# Patient Record
Sex: FEMALE | Race: WHITE | NOT HISPANIC OR LATINO | Employment: FULL TIME | URBAN - METROPOLITAN AREA
[De-identification: names, ages, dates, MRNs, and addresses within clinical notes are randomized per-mention and may not be internally consistent; named-entity substitution may affect disease eponyms.]

---

## 2017-09-07 ENCOUNTER — CONVERSION ENCOUNTER (OUTPATIENT)
Dept: MAMMOGRAPHY | Facility: HOSPITAL | Age: 57
End: 2017-09-07

## 2018-02-22 ENCOUNTER — CONVERSION ENCOUNTER (OUTPATIENT)
Dept: FAMILY MEDICINE CLINIC | Facility: CLINIC | Age: 58
End: 2018-02-22

## 2018-02-22 ENCOUNTER — OFFICE VISIT CONVERTED (OUTPATIENT)
Dept: FAMILY MEDICINE CLINIC | Facility: CLINIC | Age: 58
End: 2018-02-22
Attending: FAMILY MEDICINE

## 2018-08-24 ENCOUNTER — OFFICE VISIT CONVERTED (OUTPATIENT)
Dept: FAMILY MEDICINE CLINIC | Facility: CLINIC | Age: 58
End: 2018-08-24
Attending: FAMILY MEDICINE

## 2019-02-18 ENCOUNTER — OFFICE VISIT CONVERTED (OUTPATIENT)
Dept: FAMILY MEDICINE CLINIC | Facility: CLINIC | Age: 59
End: 2019-02-18
Attending: FAMILY MEDICINE

## 2019-02-18 ENCOUNTER — CONVERSION ENCOUNTER (OUTPATIENT)
Dept: FAMILY MEDICINE CLINIC | Facility: CLINIC | Age: 59
End: 2019-02-18

## 2019-03-07 ENCOUNTER — HOSPITAL ENCOUNTER (OUTPATIENT)
Dept: GENERAL RADIOLOGY | Facility: HOSPITAL | Age: 59
Discharge: HOME | End: 2019-03-07
Attending: FAMILY MEDICINE

## 2019-06-27 ENCOUNTER — OFFICE VISIT CONVERTED (OUTPATIENT)
Dept: FAMILY MEDICINE CLINIC | Facility: CLINIC | Age: 59
End: 2019-06-27
Attending: FAMILY MEDICINE

## 2019-11-22 ENCOUNTER — OFFICE VISIT CONVERTED (OUTPATIENT)
Dept: FAMILY MEDICINE CLINIC | Facility: CLINIC | Age: 59
End: 2019-11-22
Attending: FAMILY MEDICINE

## 2019-12-11 ENCOUNTER — HOSPITAL ENCOUNTER (OUTPATIENT)
Dept: GENERAL RADIOLOGY | Facility: HOSPITAL | Age: 59
Discharge: HOME | End: 2019-12-11
Attending: FAMILY MEDICINE

## 2019-12-20 ENCOUNTER — HOSPITAL ENCOUNTER (OUTPATIENT)
Dept: GENERAL RADIOLOGY | Facility: HOSPITAL | Age: 59
Discharge: HOME | End: 2019-12-20
Attending: FAMILY MEDICINE

## 2020-01-02 ENCOUNTER — HOSPITAL ENCOUNTER (OUTPATIENT)
Dept: GENERAL RADIOLOGY | Facility: HOSPITAL | Age: 60
Discharge: HOME | End: 2020-01-02
Attending: FAMILY MEDICINE

## 2020-01-03 ENCOUNTER — OFFICE VISIT CONVERTED (OUTPATIENT)
Dept: ORTHOPEDIC SURGERY | Facility: CLINIC | Age: 60
End: 2020-01-03
Attending: ORTHOPAEDIC SURGERY

## 2020-01-03 ENCOUNTER — CONVERSION ENCOUNTER (OUTPATIENT)
Dept: ORTHOPEDIC SURGERY | Facility: CLINIC | Age: 60
End: 2020-01-03

## 2020-01-23 ENCOUNTER — OFFICE VISIT CONVERTED (OUTPATIENT)
Dept: FAMILY MEDICINE CLINIC | Facility: CLINIC | Age: 60
End: 2020-01-23
Attending: FAMILY MEDICINE

## 2020-02-04 ENCOUNTER — HOSPITAL ENCOUNTER (OUTPATIENT)
Dept: PREADMISSION TESTING | Facility: HOSPITAL | Age: 60
Discharge: HOME | End: 2020-02-04
Attending: ORTHOPAEDIC SURGERY

## 2020-02-04 LAB
ALBUMIN SERPL-MCNC: 4.2 G/DL (ref 3.5–5)
ALBUMIN/GLOB SERPL: 1.7 {RATIO} (ref 1.4–2.6)
ALP SERPL-CCNC: 70 U/L (ref 53–141)
ALT SERPL-CCNC: 11 U/L (ref 10–40)
ANION GAP SERPL CALC-SCNC: 15 MMOL/L (ref 8–19)
APTT BLD: 23.9 S (ref 22.2–34.2)
AST SERPL-CCNC: 16 U/L (ref 15–50)
BASOPHILS # BLD AUTO: 0.05 10*3/UL (ref 0–0.2)
BASOPHILS NFR BLD AUTO: 0.7 % (ref 0–3)
BILIRUB SERPL-MCNC: 0.32 MG/DL (ref 0.2–1.3)
BUN SERPL-MCNC: 15 MG/DL (ref 5–25)
BUN/CREAT SERPL: 23 {RATIO} (ref 6–20)
CALCIUM SERPL-MCNC: 9.5 MG/DL (ref 8.7–10.4)
CHLORIDE SERPL-SCNC: 102 MMOL/L (ref 99–111)
CONV ABS IMM GRAN: 0.05 10*3/UL (ref 0–0.2)
CONV CO2: 25 MMOL/L (ref 22–32)
CONV IMMATURE GRAN: 0.7 % (ref 0–1.8)
CONV TOTAL PROTEIN: 6.7 G/DL (ref 6.3–8.2)
CREAT UR-MCNC: 0.66 MG/DL (ref 0.5–0.9)
DEPRECATED RDW RBC AUTO: 41.6 FL (ref 36.4–46.3)
EOSINOPHIL # BLD AUTO: 0.18 10*3/UL (ref 0–0.7)
EOSINOPHIL # BLD AUTO: 2.4 % (ref 0–7)
ERYTHROCYTE [DISTWIDTH] IN BLOOD BY AUTOMATED COUNT: 12.8 % (ref 11.7–14.4)
EST. AVERAGE GLUCOSE BLD GHB EST-MCNC: 114 MG/DL
GFR SERPLBLD BASED ON 1.73 SQ M-ARVRAT: >60 ML/MIN/{1.73_M2}
GLOBULIN UR ELPH-MCNC: 2.5 G/DL (ref 2–3.5)
GLUCOSE SERPL-MCNC: 93 MG/DL (ref 65–99)
HBA1C MFR BLD: 5.6 % (ref 3.5–5.7)
HCT VFR BLD AUTO: 39.6 % (ref 37–47)
HGB BLD-MCNC: 13.1 G/DL (ref 12–16)
INR PPP: 0.94 (ref 2–3)
LYMPHOCYTES # BLD AUTO: 2.16 10*3/UL (ref 1–5)
LYMPHOCYTES NFR BLD AUTO: 29.4 % (ref 20–45)
MCH RBC QN AUTO: 29.6 PG (ref 27–31)
MCHC RBC AUTO-ENTMCNC: 33.1 G/DL (ref 33–37)
MCV RBC AUTO: 89.4 FL (ref 81–99)
MONOCYTES # BLD AUTO: 0.67 10*3/UL (ref 0.2–1.2)
MONOCYTES NFR BLD AUTO: 9.1 % (ref 3–10)
NEUTROPHILS # BLD AUTO: 4.24 10*3/UL (ref 2–8)
NEUTROPHILS NFR BLD AUTO: 57.7 % (ref 30–85)
NRBC CBCN: 0 % (ref 0–0.7)
OSMOLALITY SERPL CALC.SUM OF ELEC: 287 MOSM/KG (ref 273–304)
PLATELET # BLD AUTO: 243 10*3/UL (ref 130–400)
PMV BLD AUTO: 9.3 FL (ref 9.4–12.3)
POTASSIUM SERPL-SCNC: 4.3 MMOL/L (ref 3.5–5.3)
PROTHROMBIN TIME: 10.3 S (ref 9.4–12)
RBC # BLD AUTO: 4.43 10*6/UL (ref 4.2–5.4)
SODIUM SERPL-SCNC: 138 MMOL/L (ref 135–147)
WBC # BLD AUTO: 7.35 10*3/UL (ref 4.8–10.8)

## 2020-02-12 ENCOUNTER — HOSPITAL ENCOUNTER (OUTPATIENT)
Dept: MAMMOGRAPHY | Facility: HOSPITAL | Age: 60
Discharge: HOME | End: 2020-02-12
Attending: FAMILY MEDICINE

## 2020-02-13 ENCOUNTER — OFFICE VISIT CONVERTED (OUTPATIENT)
Dept: GASTROENTEROLOGY | Facility: CLINIC | Age: 60
End: 2020-02-13
Attending: NURSE PRACTITIONER

## 2020-02-25 ENCOUNTER — HOSPITAL ENCOUNTER (OUTPATIENT)
Dept: PERIOP | Facility: HOSPITAL | Age: 60
Setting detail: HOSPITAL OUTPATIENT SURGERY
Discharge: HOME | End: 2020-02-26
Attending: FAMILY MEDICINE

## 2020-02-26 LAB
ANION GAP SERPL CALC-SCNC: 16 MMOL/L (ref 8–19)
BUN SERPL-MCNC: 12 MG/DL (ref 5–25)
BUN/CREAT SERPL: 15 {RATIO} (ref 6–20)
CALCIUM SERPL-MCNC: 9.1 MG/DL (ref 8.7–10.4)
CHLORIDE SERPL-SCNC: 100 MMOL/L (ref 99–111)
CONV CO2: 25 MMOL/L (ref 22–32)
CREAT UR-MCNC: 0.79 MG/DL (ref 0.5–0.9)
GFR SERPLBLD BASED ON 1.73 SQ M-ARVRAT: >60 ML/MIN/{1.73_M2}
GLUCOSE SERPL-MCNC: 123 MG/DL (ref 65–99)
HCT VFR BLD AUTO: 34.3 % (ref 37–47)
HGB BLD-MCNC: 11.6 G/DL (ref 12–16)
OSMOLALITY SERPL CALC.SUM OF ELEC: 285 MOSM/KG (ref 273–304)
POTASSIUM SERPL-SCNC: 4.2 MMOL/L (ref 3.5–5.3)
SODIUM SERPL-SCNC: 137 MMOL/L (ref 135–147)

## 2020-03-09 ENCOUNTER — OFFICE VISIT CONVERTED (OUTPATIENT)
Dept: ORTHOPEDIC SURGERY | Facility: CLINIC | Age: 60
End: 2020-03-09
Attending: PHYSICIAN ASSISTANT

## 2020-04-13 ENCOUNTER — OFFICE VISIT CONVERTED (OUTPATIENT)
Dept: ORTHOPEDIC SURGERY | Facility: CLINIC | Age: 60
End: 2020-04-13
Attending: ORTHOPAEDIC SURGERY

## 2020-04-23 ENCOUNTER — HOSPITAL ENCOUNTER (OUTPATIENT)
Dept: LAB | Facility: HOSPITAL | Age: 60
Discharge: HOME | End: 2020-04-23
Attending: FAMILY MEDICINE

## 2020-04-23 LAB
25(OH)D3 SERPL-MCNC: 45.1 NG/ML (ref 30–100)
ALBUMIN SERPL-MCNC: 4.5 G/DL (ref 3.5–5)
ALBUMIN/GLOB SERPL: 1.9 {RATIO} (ref 1.4–2.6)
ALP SERPL-CCNC: 73 U/L (ref 53–141)
ALT SERPL-CCNC: 14 U/L (ref 10–40)
ANION GAP SERPL CALC-SCNC: 16 MMOL/L (ref 8–19)
AST SERPL-CCNC: 16 U/L (ref 15–50)
BASOPHILS # BLD AUTO: 0.05 10*3/UL (ref 0–0.2)
BASOPHILS NFR BLD AUTO: 0.8 % (ref 0–3)
BILIRUB SERPL-MCNC: 0.37 MG/DL (ref 0.2–1.3)
BUN SERPL-MCNC: 15 MG/DL (ref 5–25)
BUN/CREAT SERPL: 18 {RATIO} (ref 6–20)
CALCIUM SERPL-MCNC: 9.7 MG/DL (ref 8.7–10.4)
CHLORIDE SERPL-SCNC: 103 MMOL/L (ref 99–111)
CHOLEST SERPL-MCNC: 204 MG/DL (ref 107–200)
CHOLEST/HDLC SERPL: 4.3 {RATIO} (ref 3–6)
CONV ABS IMM GRAN: 0.01 10*3/UL (ref 0–0.2)
CONV CO2: 26 MMOL/L (ref 22–32)
CONV IMMATURE GRAN: 0.2 % (ref 0–1.8)
CONV TOTAL PROTEIN: 6.9 G/DL (ref 6.3–8.2)
CREAT UR-MCNC: 0.82 MG/DL (ref 0.5–0.9)
DEPRECATED RDW RBC AUTO: 40.9 FL (ref 36.4–46.3)
EOSINOPHIL # BLD AUTO: 0.27 10*3/UL (ref 0–0.7)
EOSINOPHIL # BLD AUTO: 4.5 % (ref 0–7)
ERYTHROCYTE [DISTWIDTH] IN BLOOD BY AUTOMATED COUNT: 12.5 % (ref 11.7–14.4)
FOLATE SERPL-MCNC: 6.5 NG/ML (ref 4.8–20)
GFR SERPLBLD BASED ON 1.73 SQ M-ARVRAT: >60 ML/MIN/{1.73_M2}
GLOBULIN UR ELPH-MCNC: 2.4 G/DL (ref 2–3.5)
GLUCOSE SERPL-MCNC: 94 MG/DL (ref 65–99)
HCT VFR BLD AUTO: 41.8 % (ref 37–47)
HDLC SERPL-MCNC: 48 MG/DL (ref 40–60)
HGB BLD-MCNC: 13.4 G/DL (ref 12–16)
IRON SATN MFR SERPL: 28 % (ref 20–55)
IRON SERPL-MCNC: 90 UG/DL (ref 60–170)
LDLC SERPL CALC-MCNC: 134 MG/DL (ref 70–100)
LYMPHOCYTES # BLD AUTO: 1.97 10*3/UL (ref 1–5)
LYMPHOCYTES NFR BLD AUTO: 33.1 % (ref 20–45)
MCH RBC QN AUTO: 28.8 PG (ref 27–31)
MCHC RBC AUTO-ENTMCNC: 32.1 G/DL (ref 33–37)
MCV RBC AUTO: 89.7 FL (ref 81–99)
MONOCYTES # BLD AUTO: 0.58 10*3/UL (ref 0.2–1.2)
MONOCYTES NFR BLD AUTO: 9.7 % (ref 3–10)
NEUTROPHILS # BLD AUTO: 3.07 10*3/UL (ref 2–8)
NEUTROPHILS NFR BLD AUTO: 51.7 % (ref 30–85)
NRBC CBCN: 0 % (ref 0–0.7)
OSMOLALITY SERPL CALC.SUM OF ELEC: 293 MOSM/KG (ref 273–304)
PLATELET # BLD AUTO: 330 10*3/UL (ref 130–400)
PMV BLD AUTO: 9.6 FL (ref 9.4–12.3)
POTASSIUM SERPL-SCNC: 4.3 MMOL/L (ref 3.5–5.3)
RBC # BLD AUTO: 4.66 10*6/UL (ref 4.2–5.4)
SODIUM SERPL-SCNC: 141 MMOL/L (ref 135–147)
T4 FREE SERPL-MCNC: 1.5 NG/DL (ref 0.9–1.8)
TIBC SERPL-MCNC: 326 UG/DL (ref 245–450)
TRANSFERRIN SERPL-MCNC: 228 MG/DL (ref 250–380)
TRIGL SERPL-MCNC: 110 MG/DL (ref 40–150)
TSH SERPL-ACNC: 0.81 M[IU]/L (ref 0.27–4.2)
VIT B12 SERPL-MCNC: 529 PG/ML (ref 211–911)
VLDLC SERPL-MCNC: 22 MG/DL (ref 5–37)
WBC # BLD AUTO: 5.95 10*3/UL (ref 4.8–10.8)

## 2020-04-24 LAB — T3FREE SERPL-MCNC: 2.7 PG/ML (ref 2–4.4)

## 2020-05-22 ENCOUNTER — TELEMEDICINE CONVERTED (OUTPATIENT)
Dept: FAMILY MEDICINE CLINIC | Facility: CLINIC | Age: 60
End: 2020-05-22
Attending: FAMILY MEDICINE

## 2020-05-27 ENCOUNTER — OFFICE VISIT CONVERTED (OUTPATIENT)
Dept: ORTHOPEDIC SURGERY | Facility: CLINIC | Age: 60
End: 2020-05-27
Attending: ORTHOPAEDIC SURGERY

## 2020-08-20 ENCOUNTER — HOSPITAL ENCOUNTER (OUTPATIENT)
Dept: GENERAL RADIOLOGY | Facility: HOSPITAL | Age: 60
Discharge: HOME | End: 2020-08-20
Attending: FAMILY MEDICINE

## 2020-11-13 ENCOUNTER — HOSPITAL ENCOUNTER (OUTPATIENT)
Dept: GENERAL RADIOLOGY | Facility: HOSPITAL | Age: 60
Discharge: HOME | End: 2020-11-13
Attending: PODIATRIST

## 2020-11-19 ENCOUNTER — HOSPITAL ENCOUNTER (OUTPATIENT)
Dept: LAB | Facility: HOSPITAL | Age: 60
Discharge: HOME | End: 2020-11-19
Attending: FAMILY MEDICINE

## 2020-11-19 LAB
25(OH)D3 SERPL-MCNC: 58.8 NG/ML (ref 30–100)
ALBUMIN SERPL-MCNC: 4.3 G/DL (ref 3.5–5)
ALBUMIN/GLOB SERPL: 1.8 {RATIO} (ref 1.4–2.6)
ALP SERPL-CCNC: 73 U/L (ref 53–141)
ALT SERPL-CCNC: 14 U/L (ref 10–40)
ANION GAP SERPL CALC-SCNC: 16 MMOL/L (ref 8–19)
AST SERPL-CCNC: 17 U/L (ref 15–50)
BASOPHILS # BLD AUTO: 0.05 10*3/UL (ref 0–0.2)
BASOPHILS NFR BLD AUTO: 0.8 % (ref 0–3)
BILIRUB SERPL-MCNC: 0.32 MG/DL (ref 0.2–1.3)
BUN SERPL-MCNC: 16 MG/DL (ref 5–25)
BUN/CREAT SERPL: 15 {RATIO} (ref 6–20)
CALCIUM SERPL-MCNC: 10.7 MG/DL (ref 8.7–10.4)
CHLORIDE SERPL-SCNC: 105 MMOL/L (ref 99–111)
CHOLEST SERPL-MCNC: 182 MG/DL (ref 107–200)
CHOLEST/HDLC SERPL: 3.8 {RATIO} (ref 3–6)
CONV ABS IMM GRAN: 0.02 10*3/UL (ref 0–0.2)
CONV CO2: 26 MMOL/L (ref 22–32)
CONV IMMATURE GRAN: 0.3 % (ref 0–1.8)
CONV TOTAL PROTEIN: 6.7 G/DL (ref 6.3–8.2)
CREAT UR-MCNC: 1.09 MG/DL (ref 0.5–0.9)
DEPRECATED RDW RBC AUTO: 38.8 FL (ref 36.4–46.3)
EOSINOPHIL # BLD AUTO: 0.19 10*3/UL (ref 0–0.7)
EOSINOPHIL # BLD AUTO: 3 % (ref 0–7)
ERYTHROCYTE [DISTWIDTH] IN BLOOD BY AUTOMATED COUNT: 12.1 % (ref 11.7–14.4)
FOLATE SERPL-MCNC: 6.7 NG/ML (ref 4.8–20)
GFR SERPLBLD BASED ON 1.73 SQ M-ARVRAT: 56 ML/MIN/{1.73_M2}
GLOBULIN UR ELPH-MCNC: 2.4 G/DL (ref 2–3.5)
GLUCOSE SERPL-MCNC: 98 MG/DL (ref 65–99)
HCT VFR BLD AUTO: 41.8 % (ref 37–47)
HDLC SERPL-MCNC: 48 MG/DL (ref 40–60)
HGB BLD-MCNC: 13.9 G/DL (ref 12–16)
IRON SATN MFR SERPL: 26 % (ref 20–55)
IRON SERPL-MCNC: 87 UG/DL (ref 60–170)
LDLC SERPL CALC-MCNC: 111 MG/DL (ref 70–100)
LYMPHOCYTES # BLD AUTO: 2.4 10*3/UL (ref 1–5)
LYMPHOCYTES NFR BLD AUTO: 37.4 % (ref 20–45)
MCH RBC QN AUTO: 29.2 PG (ref 27–31)
MCHC RBC AUTO-ENTMCNC: 33.3 G/DL (ref 33–37)
MCV RBC AUTO: 87.8 FL (ref 81–99)
MONOCYTES # BLD AUTO: 0.52 10*3/UL (ref 0.2–1.2)
MONOCYTES NFR BLD AUTO: 8.1 % (ref 3–10)
NEUTROPHILS # BLD AUTO: 3.24 10*3/UL (ref 2–8)
NEUTROPHILS NFR BLD AUTO: 50.4 % (ref 30–85)
NRBC CBCN: 0 % (ref 0–0.7)
OSMOLALITY SERPL CALC.SUM OF ELEC: 297 MOSM/KG (ref 273–304)
PLATELET # BLD AUTO: 292 10*3/UL (ref 130–400)
PMV BLD AUTO: 9.8 FL (ref 9.4–12.3)
POTASSIUM SERPL-SCNC: 4.3 MMOL/L (ref 3.5–5.3)
RBC # BLD AUTO: 4.76 10*6/UL (ref 4.2–5.4)
SODIUM SERPL-SCNC: 143 MMOL/L (ref 135–147)
T4 FREE SERPL-MCNC: 1.3 NG/DL (ref 0.9–1.8)
TIBC SERPL-MCNC: 330 UG/DL (ref 245–450)
TRANSFERRIN SERPL-MCNC: 231 MG/DL (ref 250–380)
TRIGL SERPL-MCNC: 116 MG/DL (ref 40–150)
TSH SERPL-ACNC: 2.94 M[IU]/L (ref 0.27–4.2)
VIT B12 SERPL-MCNC: 350 PG/ML (ref 211–911)
VLDLC SERPL-MCNC: 23 MG/DL (ref 5–37)
WBC # BLD AUTO: 6.42 10*3/UL (ref 4.8–10.8)

## 2020-11-23 ENCOUNTER — CONVERSION ENCOUNTER (OUTPATIENT)
Dept: PODIATRY | Facility: CLINIC | Age: 60
End: 2020-11-23

## 2020-11-23 ENCOUNTER — OFFICE VISIT CONVERTED (OUTPATIENT)
Dept: PODIATRY | Facility: CLINIC | Age: 60
End: 2020-11-23
Attending: PODIATRIST

## 2020-11-24 ENCOUNTER — OFFICE VISIT CONVERTED (OUTPATIENT)
Dept: FAMILY MEDICINE CLINIC | Facility: CLINIC | Age: 60
End: 2020-11-24
Attending: FAMILY MEDICINE

## 2021-01-04 ENCOUNTER — OFFICE VISIT CONVERTED (OUTPATIENT)
Dept: PODIATRY | Facility: CLINIC | Age: 61
End: 2021-01-04
Attending: PODIATRIST

## 2021-01-04 ENCOUNTER — HOSPITAL ENCOUNTER (OUTPATIENT)
Dept: PREADMISSION TESTING | Facility: HOSPITAL | Age: 61
Discharge: HOME | End: 2021-01-04
Attending: PODIATRIST

## 2021-01-05 LAB — SARS-COV-2 RNA SPEC QL NAA+PROBE: NOT DETECTED

## 2021-01-08 ENCOUNTER — HOSPITAL ENCOUNTER (OUTPATIENT)
Dept: PERIOP | Facility: HOSPITAL | Age: 61
Setting detail: HOSPITAL OUTPATIENT SURGERY
Discharge: HOME | End: 2021-01-08
Attending: PODIATRIST

## 2021-01-13 ENCOUNTER — OFFICE VISIT CONVERTED (OUTPATIENT)
Dept: PODIATRY | Facility: CLINIC | Age: 61
End: 2021-01-13
Attending: PODIATRIST

## 2021-01-13 ENCOUNTER — CONVERSION ENCOUNTER (OUTPATIENT)
Dept: PODIATRY | Facility: CLINIC | Age: 61
End: 2021-01-13

## 2021-01-27 ENCOUNTER — OFFICE VISIT CONVERTED (OUTPATIENT)
Dept: PODIATRY | Facility: CLINIC | Age: 61
End: 2021-01-27
Attending: PODIATRIST

## 2021-02-17 ENCOUNTER — OFFICE VISIT CONVERTED (OUTPATIENT)
Dept: PODIATRY | Facility: CLINIC | Age: 61
End: 2021-02-17
Attending: PODIATRIST

## 2021-02-26 ENCOUNTER — HOSPITAL ENCOUNTER (OUTPATIENT)
Dept: GENERAL RADIOLOGY | Facility: HOSPITAL | Age: 61
Discharge: HOME | End: 2021-02-26
Attending: FAMILY MEDICINE

## 2021-03-01 ENCOUNTER — OFFICE VISIT CONVERTED (OUTPATIENT)
Dept: ORTHOPEDIC SURGERY | Facility: CLINIC | Age: 61
End: 2021-03-01
Attending: ORTHOPAEDIC SURGERY

## 2021-03-08 ENCOUNTER — HOSPITAL ENCOUNTER (OUTPATIENT)
Dept: GENERAL RADIOLOGY | Facility: HOSPITAL | Age: 61
Discharge: HOME | End: 2021-03-08
Attending: PODIATRIST

## 2021-03-10 ENCOUNTER — OFFICE VISIT CONVERTED (OUTPATIENT)
Dept: PODIATRY | Facility: CLINIC | Age: 61
End: 2021-03-10
Attending: PODIATRIST

## 2021-04-13 ENCOUNTER — HOSPITAL ENCOUNTER (OUTPATIENT)
Dept: GENERAL RADIOLOGY | Facility: HOSPITAL | Age: 61
Discharge: HOME | End: 2021-04-13
Attending: INTERNAL MEDICINE

## 2021-05-10 NOTE — H&P
History and Physical      Patient Name: Elaine Sprigler   Patient ID: [id number]   Sex: Female   YOB: 1960    Primary Care Provider: Darnell Umana MD   Referring Provider: Darnell Umana MD    Visit Date: November 23, 2020    Provider: Dwight Campbell DPM   Location: [facility]   Location Address: [address] [place] [address]  [id number]   Location Phone: [phone]          Chief Complaint  · Left Foot Pain      History Of Present Illness  Elaine L. Sprigler is a 59 year old /White female who presents to the [facility] today new patient referred from Darnell Umana MD.      New, Established, New Problem:  new  Location:  Left 1st and 2nd toe  Duration:  2000  Onset:  post-op from injury and bunionectomy in 2000 in [place]  Nature:  sore  Stable, worsening, improving:  worsening    Aggravating factors:   Patient relates pain is aggravated by shoe gear and ambulation.   Previous Treatment:  ORIF from fracture in 2000.    Patient denies any fevers, chills, nausea, vomiting, shortness of breathe, nor any other constitutional signs nor symptoms.    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         Past Medical History  Abdominal pain; Anemia; Bladder Disorder; Broken Bones; Bunion; Corns and callus; Depression; Diverticulitis; Elevated glucose; Essential hypertension; Fatigue; Gastric Ulcer; GERD (gastroesophageal reflux disease); Headache; Heart Attack; Heart Disease; Heart Murmur; Hernia; High blood pressure; High cholesterol; Hyperlipemia; Hypertension; Hypothyroidism; Limb Swelling; Migraine Headaches; Mitral valve prolapse; Primary osteoarthritis of hip, right; Primary osteoarthritis of right hip; Reflux Disease; Right Hip Trochanteric Bursitis; Seasonal allergies; Stress Incontinence, Female; Syncope and collapse; Thyroid disease; Thyroid disorder; Thyroid Problems; Weight Gain, Abnormal         Past Surgical History  Arthroscopic knee surgery, left; Artificial  Joints/Limbs; Colonoscopy; Excision of Mortons neuroma; Eye Implant; Foot surgery; Gastric Sleeve; Hysterectomy; Joint Surgery; Knee Replacement; Tonsillectomy         Medication List  clotrimazole-betamethasone 1-0.05 % topical cream; nystatin 100,000 unit/gram topical powder; pravastatin 40 mg oral tablet; Singulair 10 mg oral tablet; spironolactone 25 mg oral tablet; Tirosint 125 mcg oral capsule; Vitamin D3 125 mcg (5,000 unit) oral tablet; Zyrtec 10 mg oral tablet         Allergy List  morphine       Allergies Reconciled  Family Medical History  Stroke; Heart Disease; Diabetes, unspecified type; Hypothyroidism; Heart Attack (MI); Family history of certain chronic disabling diseases; arthritis; No family history of colorectal cancer; Family history of stroke; Family history of heart disease         Reproductive History   5 Para 0 0 0 3       Social History  Alcohol (Current - status unknown); Alcohol Use (Never); Claustophobic (Unknown); Customer Service; 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); lives with children; lives with spouse; ; Recreational Drug Use (Never); Sedentary; Tobacco (Former); Tobacco use (Current every day); Unemployed; ; Working         Immunizations  Name Date Admin   Hepatitis A 2019   Hepatitis A 2018   Influenza 2019   Influenza 2019   Influenza 10/17/2016   Akobynnrw39 2019         Review of Systems  · Constitutional  o Denies  o : fatigue, night sweats  · Eyes  o Denies  o : double vision, blurred vision  · HENT  o Denies  o : vertigo, recent head injury  · Cardiovascular  o Denies  o : chest pain, irregular heart beats  · Respiratory  o Denies  o : shortness of breath, productive cough  · Gastrointestinal  o Denies  o : nausea, vomiting  · Genitourinary  o Denies  o : dysuria, urinary retention  · Integument  o Denies  o : hair growth change, new skin lesions  · Neurologic  o Denies  o : altered mental status, seizures  · Musculoskeletal  o *  "See HPI  · Endocrine  o Denies  o : cold intolerance, heat intolerance  · Heme-Lymph  o Denies  o : petechiae, lymph node enlargement or tenderness  · Allergic-Immunologic  o Denies  o : frequent illnesses      Vitals  Date Time BP Position Site L\R Cuff Size HR RR TEMP (F) WT  HT  BMI kg/m2 BSA m2 O2 Sat FR L/min FiO2 HC       11/23/2020 08:03 /60 Sitting    90 - R  97.6 220lbs 8oz 5'  7\" 34.53 2.17 98 %            Physical Examination  · Constitutional  o Appearance  o : well developed, well-nourished, no obvious deformities present  · Cardiovascular  o Peripheral Vascular System  o :   § Pedal Pulses  § : pulses 2 + and symmetrical  § Extremities  § : no edema in lower extremities  · Musculoskeletal  o General  o :   § General Musculoskeletal  § : Lower extremity muscle and strength and range of motion is equal and symmetrical bilaterally. The knees are noted to be normal in alignment. Left Medial deviation of the first metatarsal with associated lateral deviation of the hallux at the metatarsal phalangeal joint and reducible Left 2nd contracted hammertoe. No signs of edema, erythema, lymphangitis, nor signs of infection.   · Skin and Subcutaneous Tissue  o General Inspection  o : Skin is noted to have normal texture and turgor, with no excrescences noted.   o Digits and Nails  o : The toenails are noted to be without disease.  · Neurologic  o Sensation  o : Epicritic sensations intact bilaterally.     Dr. Campbell reviewed radiographs and results from [facility] and discussed them with the patient.  These are significant for Left Medial deviation of the first metatarsal with associated lateral deviation of the hallux at the metatarsal phalangeal joint contracted 2nd toe.           Assessment  · Foot pain, left     729.5/M79.672  · Hammertoe of left foot     735.4/M20.42  · Hallux abducto valgus, left     735.0/M20.12      Plan  · Orders  o SAQIB Report (KASPR) - - 11/23/2020  o [facility] [place] Covid-19 Screening (10888) - - 11/23/2020  o Sang bunionectomy (85494) - - 11/23/2020  o Bunionectomy, Silvia (53108) - - 11/23/2020  o Arthrodesis of left second toe (81161) - - 11/23/2020  o Flexor tenolysis of foot (18616) - - 11/23/2020  o Capsulotomy of metatarsophalangeal joint (23538) - - 11/23/2020  · Medications  o Medications have been Reconciled  o Transition of Care or Provider Policy  · Instructions  o Discuss Findings: I have discussed the findings of this evaluation with the patient. The discussion included a complete verbal explanation of any changes in the examination results, diagnosis, and the current treatment plan. A schedule for future care needs was explained. If any questions should arise after returning home, I have encouraged the patient to feel free to contact Dr. Campbell. The patient states understanding and agreement with this plan.  o Procedure: Left distal bunionectomy and Left 2nd toe arthroplasty of DIPJ/PIPJ/MPJ. Upon discussion of non-surgical conservative option, surgical correction, post-operative requirements along with risk and benefits of the surgery along with expected outcomes, the patient states they would like to proceed with the scheduling surgery.  o Electronically Identified Patient Education Materials Provided Electronically  · Disposition  o Call or Return if symptoms worsen or persist.            Electronically Signed by: Dwight Campbell DPM -Author on November 23, 2020 08:31:54 AM

## 2021-05-12 NOTE — PROGRESS NOTES
Progress Note      Patient Name: Elaine Sprigler   Patient ID: [id number]   Sex: Female   YOB: 1960    Primary Care Provider: Darnell Umana MD   Referring Provider: Darnell Umana MD    Visit Date: April 13, 2020    Provider: Osito St MD   Location: [facility]   Location Address: [address] [place] [address]  [id number]   Location Phone: [phone]          Chief Complaint  · Left knee pain       History Of Present Illness  Elaine L. Sprigler is a 59 year old /White female who presents today to [facility]. Patient is post-op left total knee arthroplasty performed on 2/25/20. Patient is attending physical therapy at [facility]. Patient states mild pain. Patient states continue to improve on range of motion and strength. Patient is using a cane for ambulation assistance.       Past Medical History  Abdominal pain; Anemia; Bladder Disorder; Broken Bones; Depression; Diverticulitis; Elevated glucose; Essential hypertension; Fatigue; Gastric Ulcer; GERD (gastroesophageal reflux disease); Headache; Heart Attack; Heart Disease; Heart Murmur; Hernia; High blood pressure; High cholesterol; Hyperlipemia; Hypertension; Hypothyroidism; Limb Swelling; Migraine Headaches; Mitral valve prolapse; Primary osteoarthritis of hip, right; Primary osteoarthritis of right hip; Reflux Disease; Right Hip Trochanteric Bursitis; Seasonal allergies; Stress Incontinence, Female; Syncope and collapse; Thyroid disease; Thyroid disorder; Thyroid Problems; Weight Gain, Abnormal         Past Surgical History  Arthroscopic knee surgery, left; Artificial Joints/Limbs; Colonoscopy; Eye Implant; Foot surgery; Gastric Sleeve; Hysterectomy; Joint Surgery; Knee Replacement; Tonsillectomy         Medication List  clotrimazole-betamethasone 1-0.05 % topical cream; nystatin 100,000 unit/gram topical powder; Percocet 7.5-325 mg oral tablet; pravastatin 40 mg oral tablet; Singulair 10 mg oral tablet; spironolactone  "25 mg oral tablet; Tirosint 125 mcg oral capsule; Vitamin D3 5,000 unit oral tablet; Zyrtec 10 mg oral tablet         Allergy List  morphine         Family Medical History  Stroke; Heart Disease; Diabetes, unspecified type; Hypothyroidism; Heart Attack (MI); Family history of certain chronic disabling diseases; arthritis; No family history of colorectal cancer; Family history of stroke; Family history of heart disease         Reproductive History   5 Para 0 0 0 3       Social History  Alcohol (Current - status unknown); Alcohol Use (Never); Claustophobic (Unknown); Customer Service; 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); lives with children; lives with spouse; ; Recreational Drug Use (Never); Sedentary; Tobacco (Former); Tobacco use (Current every day); Unemployed; ; Working         Review of Systems  · Constitutional  o Denies  o : fever, chills, weight loss  · Cardiovascular  o Denies  o : chest pain, shortness of breath  · Gastrointestinal  o Denies  o : liver disease, heartburn, nausea, blood in stools  · Genitourinary  o Denies  o : painful urination, blood in urine  · Integument  o Denies  o : rash, itching  · Neurologic  o Denies  o : headache, weakness, loss of consciousness  · Musculoskeletal  o Denies  o : painful, swollen joints  · Psychiatric  o Denies  o : drug/alcohol addiction, anxiety, depression      Vitals  Date Time BP Position Site L\R Cuff Size HR RR TEMP (F) WT  HT  BMI kg/m2 BSA m2 O2 Sat        2020 08:10 AM      94 - R   213lbs 4oz 5'  7\" 33.4 2.14 99 %          Physical Examination  · Constitutional  o Appearance  o : well developed, well-nourished, no obvious deformities present  · Head and Face  o Head  o :   § Inspection  § : normocephalic  o Face  o :   § Inspection  § : no facial lesions  · Eyes  o Conjunctivae  o : conjunctivae normal  o Sclerae  o : sclerae white  · Ears, Nose, Mouth and Throat  o Ears  o :   § External Ears  § : appearance within normal " limits  § Hearing  § : intact  o Nose  o :   § External Nose  § : appearance normal  · Neck  o Inspection/Palpation  o : normal appearance  o Range of Motion  o : full range of motion  · Respiratory  o Respiratory Effort  o : breathing unlabored  o Inspection of Chest  o : normal appearance  o Auscultation of Lungs  o : no audible wheezing or rales  · Cardiovascular  o Heart  o : regular rate  · Gastrointestinal  o Abdominal Examination  o : soft and non-tender  · Skin and Subcutaneous Tissue  o General Inspection  o : intact, no rashes  · Psychiatric  o General  o : Alert and oriented x3  o Judgement and Insight  o : judgment and insight intact  o Mood and Affect  o : mood normal, affect appropriate  · Left Knee  o Inspection  o : No skin discoloration, atrophy or swelling. Scars well-healed. Full extension. Full flexion. She has 4/5 strength compared bilaterally. Neurovascularly and sensation grossly intact.           Assessment  · Left total knee arthroplasty-Aftercare;following joint replacement     V54.81/Z47.1  · Left knee pain, unspecified chronicity     719.46/M25.562      Plan  · Medications  o Medications have been Reconciled  o Transition of Care or Provider Policy  · Instructions  o Reviewed the patient's Past Medical, Social, and Family history as well as the ROS at today's visit, no changes.  o Call or return if worsening symptoms.  o This note is transcribed by Gi joseph/lisseth  o Continue physical therapy at [facility]. Prescribe Percocet 7.5/325 1 pill q6h as needed for pain, #30. Follow-up in 6 weeks, re-x-ray at that time.             Electronically Signed by: Gi Payton, -Author on April 13, 2020 03:48:16 PM  Electronically Co-signed by: Maranda Dale PA-C -Reviewer on April 14, 2020 08:08:58 AM  Electronically Co-signed by: Oisto St MD -Reviewer on April 15, 2020 11:29:50 AM

## 2021-05-13 NOTE — PROGRESS NOTES
Progress Note      Patient Name: Elaine Sprigler   Patient ID: [id number]   Sex: Female   YOB: 1960    Primary Care Provider: Darnell Umana MD   Referring Provider: Darnell Umana MD    Visit Date: November 24, 2020    Provider: Darnell Umana MD   Location: [facility]   Location Address: [address] [place] [address], [address]  [place], [place]  [id number]   Location Phone: [phone]          Chief Complaint  · Annual Exam  · (Health Maintainence Information Reviewed Under Results)      History Of Present Illness  Elaine L. Sprigler is a 59 year old /White female who presents for evaluation and treatment of:   Date of Last Mammogram: 2/2020.   No current complaints.       Past Medical History  Disease Name Date Onset Notes   Abdominal Pain --  --    Anemia --  --    Bladder Disorder --  --    Broken Bones --  --    Bunion --  --    Corns and callus --  --    Depression --  --    Diverticulitis --  --    Elevated glucose 02/11/2013 --    Essential hypertension --  --    Fatigue --  --    Gastric Ulcer --  --    GERD (gastroesophageal reflux disease) --  --    Headache --  --    Heart Attack --  --    Heart Disease --  --    Heart Murmur --  --    Hernia --  --    High blood pressure --  --    High cholesterol --  --    Hyperlipemia --  --    Hypertension --  --    Hypothyroidism --  --    Limb Swelling --  --    Migraine Headaches --  --    Mitral valve prolapse --  --    Primary osteoarthritis of hip, right 10/10/2017 --    Primary osteoarthritis of right hip 09/20/2017 --    Reflux Disease --  --    Right Hip Trochanteric Bursitis 09/20/2017 --    Seasonal allergies --  --    Stress Incontinence, Female --  --    Syncope and collapse --  --    Thyroid disease --  --    Thyroid disorder --  --    Thyroid Problems --  --    Weight Gain, Abnormal --  --          Past Surgical History  Procedure Name Date Notes   Arthroscopic knee surgery, left --  --    Artificial Joints/Limbs  --  --    Colonoscopy 2002 --    Excision of Mortons neuroma --  --    Eye Implant --  yes   Foot surgery --  left foot - plate   Gastric Sleeve --  --    Hysterectomy --  --    Joint Surgery --  --    Knee Replacement --  --    Tonsillectomy --  --          Medication List  Name Date Started Instructions   clotrimazole-betamethasone 1-0.05 % topical cream 05/22/2020 apply to the affected and surrounding areas of skin by topical route 2 times per day in the morning and evening for 4 weeks   nystatin 100,000 unit/gram topical powder 05/22/2020 apply to the affected area(s) by topical route 2 times per day   pravastatin 40 mg oral tablet 05/22/2020 take 1 tablet (40 mg) by oral route once daily at bedtime for 90 days   Singulair 10 mg oral tablet 05/22/2020 take 1 tablet (10 mg) by oral route once daily in the evening for 90 days   spironolactone 25 mg oral tablet 05/22/2020 take 1 tablet (25 mg) by oral route once daily for 90 days   Tirosint 125 mcg oral capsule  take 1 capsule (125 mcg) by oral route once daily   Vitamin D3 125 mcg (5,000 unit) oral tablet 05/22/2020 take 1 tablet by oral route daily for 90 days   Zyrtec 10 mg oral tablet 05/22/2020 take 1 tablet (10 mg) by oral route once daily for 90 days         Allergy List  Allergen Name Date Reaction Notes   morphine --  --  --        Allergies Reconciled  Family Medical History  Disease Name Relative/Age Notes   Stroke Mother/   Mother  grandparents   Heart Disease Father/   Father  Mother  aunt/uncle   Diabetes, unspecified type Brother/  Mother/   Mother; Brother  Mother  bro/sis   Hypothyroidism Sister/   --    Heart Attack (MI)  aunt/uncle   Family history of certain chronic disabling diseases; arthritis Mother/   Mother   No family history of colorectal cancer  --    Family history of stroke Mother/   Mother   Family history of heart disease Father/   Father         Reproductive History  Menstrual   Certainty of LMP Date: Jan 2001   Pregnancy  Summary   Total Pregnancies: 5 Full Term: 0 Premature: 0   Ab Induced: 0 Ab Spontaneous: 0 Ectopics: 0   Multiples: 0 Living: 3         Social History  Finding Status Start/Stop Quantity Notes   Alcohol Current - status unknown --/-- --  wine   Alcohol Use Never --/-- --  does not drink  occasionally drinks, less than 1 drink per day  rarely drinks, less than 1 drink per day, has been drinking for 21-30 years   Claustophobic Unknown --/-- --  yes   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   Denies substance abuse Never --/-- --  Denies FHx of abuse   lives with children --  --/-- --  --    lives with spouse --  --/-- --  --     --  --/-- --  lives with  and grandson   Recreational Drug Use Never --/-- --  no   Sedentary --  --/-- --  --    Tobacco Former --/-- --  former smoker   Tobacco use Current every day --/-- 1 PPD current every day smoker, 1 packs per day, smoked 6-10 years  former smoker   Unemployed --  --/-- --  --     --  --/-- --  --    Working --  --/-- --  --          Immunizations  NameDate Admin Mfg Trade Name Lot Number Route Inj VIS Given VIS Publication   Hepatitis A02/18/2019 SKB HAVRIX-ADULT [id number]  LD 02/18/2019 07/20/2016   Comments:    Hepatitis A04/25/2018 SKB HAVRIX-ADULT  IM LD 04/25/2018 10/25/2011   Comments: pt tolerated injection well   Cnlepxzir53/24/2020 PMC Fluzone Quadrivalent [id number] IM LD 11/24/2020 08/15/2019   Comments: pt tolerated   Hlxtfzmov8890/22/2019 MSD PNEUMOVAX 23 [id number] IM RA 11/22/2019    Comments: Patient tolerated injection well.         Review of Systems  · Constitutional  o Denies  o : night sweats  · Eyes  o Denies  o : double vision, blurred vision  · HENT  o Denies  o : vertigo, recent head injury  · Breasts  o Denies  o : abnormal changes in breast size, additional breast symptoms except as noted in the HPI  · Cardiovascular  o Denies  o : chest pain, irregular heart beats  · Respiratory  o Denies  o : shortness of  "breath, productive cough  · Gastrointestinal  o Denies  o : nausea, vomiting  · Genitourinary  o Denies  o : dysuria, urinary retention  · Integument  o Denies  o : hair growth change, new skin lesions  · Neurologic  o Denies  o : altered mental status, seizures  · Musculoskeletal  o Denies  o : joint swelling, limitation of motion  · Endocrine  o Denies  o : cold intolerance, heat intolerance  · Heme-Lymph  o Denies  o : petechiae, lymph node enlargement or tenderness  · Allergic-Immunologic  o Denies  o : frequent illnesses      Vitals  Date Time BP Position Site L\R Cuff Size HR RR TEMP (F) WT  HT  BMI kg/m2 BSA m2 O2 Sat FR L/min FiO2 HC       11/24/2020 02:53 /78 Sitting    93 - R 18 97.1 219lbs 8oz 5'  7\" 34.38 2.17 98 %  21%          Physical Examination  · Constitutional  o Appearance  o : well-nourished, in no acute distress  · Neck  o Inspection/Palpation  o : normal appearance, no masses or tenderness, trachea midline  o Thyroid  o : gland size normal, nontender, no nodules or masses present on palpation  · Respiratory  o Respiratory Effort  o : breathing unlabored  o Inspection of Chest  o : normal appearance  o Auscultation of Lungs  o : normal breath sounds throughout  · Cardiovascular  o Heart  o :   § Auscultation of Heart  § : regular rate and rhythm  · Breasts  o Inspection of Breasts  o : breasts symmetrical, no skin changes, no deformities present, no discharge present  o Palpation of Breasts, Axillae  o : no masses present on palpation, no breast tenderness  · Gastrointestinal  o Abdominal Examination  o : abdomen nontender to palpation, tone normal without rigidity or guarding, no masses present, normal bowel sounds  · Psychiatric  o Judgement and Insight  o : judgment and insight intact  o Mood and Affect  o : mood normal, affect appropriate          Assessment  · Need for influenza vaccination     V04.81/Z23  · Annual physical " exam     V70.0/Z00.00  · Anemia     285.9/D64.9  · Hyperlipidemia     272.4/E78.5  · Hypothyroidism     244.9/E03.9  · Vitamin D deficiency     268.9/E55.9  · B12 deficiency     266.2/E53.8  · Abnormal kidney function     593.9/N28.9  · Screening for breast cancer     V76.10/Z12.39       flu shot today  mammogram.  cscope next visit..she wants to wait...due to covid in the community.  sudafed prn    recheck kidney function in 1 month.  start b12  stop tums..causing calcium to be too high.       Plan  · Orders  o ACO-39: Current medications updated and reviewed (1159F, ) - - 11/24/2020  o ACO-14: Influenza immunization administered or previously received [facility] () - - 11/24/2020  o Free T4 (08318) - 272.4/E78.5, 244.9/E03.9 - 05/24/2021  o Physical, Primary Care Panel (CBC, CMP, Lipid, TSH) [facility] (85453, 72031, 08028, 12866) - 285.9/D64.9, 272.4/E78.5, 244.9/E03.9, 266.2/E53.8 - 05/24/2021  o Vitamin D (25-Hydroxy) Level (45401) - 268.9/E55.9 - 05/24/2021  o B12 Folate levels (B12FO) - 266.2/E53.8 - 05/24/2021  o Iron Profile (Iron 25648 TIBC 32085 and Transferrin 22446) (IRONP) - 285.9/D64.9 - 05/24/2021  o CMP [facility] (53188) - 593.9/N28.9 - 12/24/2020  o Mammogram breast screening 3D digital bilateral (40337, , 41775) - V76.10/Z12.39 - 11/24/2020  o IM/SQ - Injection Fee [facility] (07352) - V04.81/Z23 - 11/24/2020  o Fluzone Quadrivalent Vaccine, age 6 months + (57391) - V04.81/Z23 - 11/24/2020   Vaccine - Influenza; Dose: 0.5; Site: Left Deltoid; Route: Intramuscular; Date: 11/24/2020 15:50:00; Exp: 06/30/2021; Lot: [id number]; Mfg: Wikibon pasteur; TradeName: Fluzone Quadrivalent; Administered By: Gladys Carmen MA; Comment: pt tolerated  · Medications  o Medications have been Reconciled  o Transition of Care or Provider Policy  · Instructions  o Reviewed health maintenance flowsheet and updated information. Orders were placed and/or patient's response was documented.  o Advised that cheeses and other sources of dairy  fats, animal fats, fast food, and the extras (candy, pastries, pies, doughnuts and cookies) all contain LDL raising nutrients. Advised to increase fruits, vegetables, whole grains, and to monitor portion sizes.   o Patient was educated/instructed on their diagnosis, treatment and medications prior to discharge from the clinic today.  o Counseled on monthly breast self exams.   o Counseled on STD prevention.  o Counseled on diet and exercise.   o Counseled on weight-bearing exercise.  o Recommended Calcium with Vitamin D twice daily.  o Electronically Identified Patient Education Materials Provided Electronically  · Disposition  o Call or Return if symptoms worsen or persist.  o Care Transition            Electronically Signed by: Darnell Umana MD -Author on November 24, 2020 05:50:37 PM

## 2021-05-13 NOTE — PROGRESS NOTES
Quick Note      Patient Name: Elaine Sprigler   Patient ID: [id number]   Sex: Female   YOB: 1960    Primary Care Provider: Darnell Umana MD   Referring Provider: Darnell Umana MD    Visit Date: May 22, 2020    Provider: Darnell Umana MD   Location: [facility]   Location Address: [address] [place] [address], [address]  [id number]   Location Phone: [phone]          History Of Present Illness  TELEHEALTH TELEPHONE VISIT  Chief Complaint: f/u labs   Elaine L. Sprigler is a 59 year old /White female who is presenting for evaluation via telehealth telephone visit. Verbal consent obtained before beginning visit.   Provider spent 15 min minutes with the patient during telehealth visit.   The following staff were present during this visit: Dr. Umana, telehealth via telephone conversation   Past Medical History/Overview of Patient Symptoms     Pt for f/u.  Hx of hypothyroidism. Controlled. She sees Dr. Iraheta    Hx of HLD..improved but still uncontrolled. she is on pravastatin 3 times a week.            Vitals     telehealth       Physical Examination     telehealth           Assessment  · Anemia     285.9/D64.9  · Hyperlipidemia     272.4/E78.5  · Hypothyroidism     244.9/E03.9  · Vitamin D deficiency     268.9/E55.9  · Post menopausal syndrome     627.9/N95.1  · Routine lab draw     V72.60/Z01.89  · Vitamin B12 deficiency     266.2/E53.8       f/u as directed.  take the pravastatin daily.          Plan  · Orders  o Free T4 (84161) - 244.9/E03.9 - 11/22/2020  o Physical, Primary Care Panel (CBC, CMP, Lipid, TSH) [facility] (79586, 42578, 37047, 08573) - 272.4/E78.5, 244.9/E03.9, 285.9/D64.9, V72.60/Z01.89 - 11/22/2020  o Vitamin D (25-Hydroxy) Level (91629) - 268.9/E55.9, V72.60/Z01.89 - 11/22/2020  o Physician Telephone Evaluation, 11-20 minutes (24598) - - 05/22/2020  o ACO-14: Influenza immunization administered or previously received () - - 05/22/2020  o ACO-39: Current  medications updated and reviewed () - - 05/22/2020  o B12 Folate levels (B12FO) - V72.60/Z01.89, 266.2/E53.8 - 11/22/2020  o Iron Profile (Iron, TIBC, and Transferrin) (IRONP) - 285.9/D64.9, V72.60/Z01.89 - 11/22/2020  o DEXA Bone Density, 1 or more sites, axial skeleton [facility] (96854) - 627.9/N95.1 - 06/01/2020  · Medications  o Medications have been Reconciled  · Instructions  o Advised that cheeses and other sources of dairy fats, animal fats, fast food, and the extras (candy, pastries, pies, doughnuts and cookies) all contain LDL raising nutrients. Advised to increase fruits, vegetables, whole grains, and to monitor portion sizes.   o Plan Of Care:   o Electronically Identified Patient Education Materials Provided Electronically  · Disposition  o Call or Return if symptoms worsen or persist.  o Care Transition            Electronically Signed by: Darnell Umana MD -Author on May 26, 2020 05:44:19 AM

## 2021-05-13 NOTE — PROGRESS NOTES
Progress Note      Patient Name: Elaine Sprigler   Patient ID: [id number]   Sex: Female   YOB: 1960    Primary Care Provider: Darnell Umana MD   Referring Provider: Darnell Umana MD    Visit Date: May 27, 2020    Provider: Osito St MD   Location: [facility]   Location Address: [address] [place] [address]  [id number]   Location Phone: [phone]          Chief Complaint  · S/P Left Total Knee Arthroplasty  · Right Hip Pain      History Of Present Illness  Elaine L. Sprigler is a 59 year old /White female who presents today to [facility].      She is post-op left total knee arthroplasty performed on 2/25/2020. Patient complains of some left distal IT band pain, especially with using stairs. Patient has been attending physical therapy. Patient also has complaints of right lateral hip pain today. Patient states only 1 week of pain relief with right trochanteric bursa injection.       Past Medical History  Abdominal pain; Anemia; Bladder Disorder; Broken Bones; Depression; Diverticulitis; Elevated glucose; Essential hypertension; Fatigue; Gastric Ulcer; GERD (gastroesophageal reflux disease); Headache; Heart Attack; Heart Disease; Heart Murmur; Hernia; High blood pressure; High cholesterol; Hyperlipemia; Hypertension; Hypothyroidism; Limb Swelling; Migraine Headaches; Mitral valve prolapse; Primary osteoarthritis of hip, right; Primary osteoarthritis of right hip; Reflux Disease; Right Hip Trochanteric Bursitis; Seasonal allergies; Stress Incontinence, Female; Syncope and collapse; Thyroid disease; Thyroid disorder; Thyroid Problems; Weight Gain, Abnormal         Past Surgical History  Arthroscopic knee surgery, left; Artificial Joints/Limbs; Colonoscopy; Eye Implant; Foot surgery; Gastric Sleeve; Hysterectomy; Joint Surgery; Knee Replacement; Tonsillectomy         Medication List  clotrimazole-betamethasone 1-0.05 % topical cream; nystatin 100,000 unit/gram topical  "powder; pravastatin 40 mg oral tablet; Singulair 10 mg oral tablet; spironolactone 25 mg oral tablet; Tirosint 125 mcg oral capsule; Vitamin D3 125 mcg (5,000 unit) oral tablet; Zyrtec 10 mg oral tablet         Allergy List  morphine         Family Medical History  Stroke; Heart Disease; Diabetes, unspecified type; Hypothyroidism; Heart Attack (MI); Family history of certain chronic disabling diseases; arthritis; No family history of colorectal cancer; Family history of stroke; Family history of heart disease         Reproductive History   5 Para 0 0 0 3       Social History  Alcohol (Current - status unknown); Alcohol Use (Never); Claustophobic (Unknown); Customer Service; 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); lives with children; lives with spouse; ; Recreational Drug Use (Never); Sedentary; Tobacco (Former); Tobacco use (Current every day); Unemployed; ; Working         Immunizations  Name Date Admin   Hepatitis A    Hepatitis A    Influenza    Influenza    Influenza    [id number]          Review of Systems  · Constitutional  o Denies  o : fever, chills, weight loss  · Cardiovascular  o Denies  o : chest pain, shortness of breath  · Gastrointestinal  o Denies  o : liver disease, heartburn, nausea, blood in stools  · Genitourinary  o Denies  o : painful urination, blood in urine  · Integument  o Denies  o : rash, itching  · Neurologic  o Denies  o : headache, weakness, loss of consciousness  · Musculoskeletal  o Denies  o : painful, swollen joints  · Psychiatric  o Denies  o : drug/alcohol addiction, anxiety, depression      Vitals  Date Time BP Position Site L\R Cuff Size HR RR TEMP (F) WT  HT  BMI kg/m2 BSA m2 O2 Sat        2020 07:45 AM      72 - R   214lbs 16oz 5'  7\" 33.67 2.15 98 %          Physical Examination  · Constitutional  o Appearance  o : well developed, well-nourished, no obvious deformities present  · Head and Face  o Head  o :   § Inspection  § : normocephalic  o Face  o : "   § Inspection  § : no facial lesions  · Eyes  o Conjunctivae  o : conjunctivae normal  o Sclerae  o : sclerae white  · Ears, Nose, Mouth and Throat  o Ears  o :   § External Ears  § : appearance within normal limits  § Hearing  § : intact  o Nose  o :   § External Nose  § : appearance normal  · Neck  o Inspection/Palpation  o : normal appearance  o Range of Motion  o : full range of motion  · Respiratory  o Respiratory Effort  o : breathing unlabored  o Inspection of Chest  o : normal appearance  o Auscultation of Lungs  o : no audible wheezing or rales  · Cardiovascular  o Heart  o : regular rate  · Gastrointestinal  o Abdominal Examination  o : soft and non-tender  · Skin and Subcutaneous Tissue  o General Inspection  o : intact, no rashes  · Psychiatric  o General  o : Alert and oriented x3  o Judgement and Insight  o : judgment and insight intact  o Mood and Affect  o : mood normal, affect appropriate  · Right Hip  o Inspection  o : Full weight bearing. Full hip ROM. Tender greater trochanteric bursa. Good strength of quadriceps, hamstrings, dorsiflexors, and plantar flexors. Neurovascularly intact. Sensation grossly intact.   · Left Knee  o Inspection  o : Full weight bearing. Well-healed scar. No signs of infection. Range of motion 0-130. Stable to valgus/varus stress. Patella well-tracking. Good strength of quadriceps, hamstrings, dorsiflexors, and plantar flexors. Neurovascularly intact. Sensation grossly intact.           Assessment  · Aftercare left total knee arthroplasty     V54.81/Z47.1  · Right Pain: Hip     719.45/M25.559      Plan  · Medications  o Medications have been Reconciled  o Transition of Care or Provider Policy  · Instructions  o Reviewed the patient's Past Medical, Social, and Family history as well as the ROS at today's visit, no changes.  o Call or return if worsening symptoms.  o Exercise handout given.  o The above service was scribed by Ebony Mckeon on my behalf and I attest to the  accuracy of the note. lisseth  o The plan is home exercises for lateral right hip pain. Follow-up 1 year post-op for bilateral knee x-rays.  o Electronically Identified Patient Education Materials Provided Electronically            Electronically Signed by: Nayana Mckeon - , Other -Author on May 27, 2020 08:24:39 AM  Electronically Co-signed by: Osito St MD -Reviewer on May 29, 2020 10:54:26 AM

## 2021-05-14 VITALS
HEIGHT: 67 IN | SYSTOLIC BLOOD PRESSURE: 130 MMHG | TEMPERATURE: 97.6 F | OXYGEN SATURATION: 98 % | HEART RATE: 90 BPM | DIASTOLIC BLOOD PRESSURE: 60 MMHG | BODY MASS INDEX: 34.61 KG/M2 | WEIGHT: 220.5 LBS

## 2021-05-14 VITALS
OXYGEN SATURATION: 100 % | HEIGHT: 67 IN | SYSTOLIC BLOOD PRESSURE: 116 MMHG | HEART RATE: 87 BPM | TEMPERATURE: 96.7 F | DIASTOLIC BLOOD PRESSURE: 76 MMHG

## 2021-05-14 VITALS
SYSTOLIC BLOOD PRESSURE: 124 MMHG | TEMPERATURE: 97.1 F | WEIGHT: 219.5 LBS | HEIGHT: 67 IN | DIASTOLIC BLOOD PRESSURE: 78 MMHG | HEART RATE: 93 BPM | RESPIRATION RATE: 18 BRPM | BODY MASS INDEX: 34.45 KG/M2 | OXYGEN SATURATION: 98 %

## 2021-05-14 VITALS
TEMPERATURE: 97.3 F | DIASTOLIC BLOOD PRESSURE: 76 MMHG | HEIGHT: 67 IN | SYSTOLIC BLOOD PRESSURE: 137 MMHG | OXYGEN SATURATION: 100 % | BODY MASS INDEX: 35.21 KG/M2 | WEIGHT: 224.37 LBS | HEART RATE: 82 BPM

## 2021-05-14 VITALS
HEART RATE: 74 BPM | WEIGHT: 229 LBS | BODY MASS INDEX: 35.94 KG/M2 | HEIGHT: 67 IN | TEMPERATURE: 96.7 F | DIASTOLIC BLOOD PRESSURE: 50 MMHG | SYSTOLIC BLOOD PRESSURE: 120 MMHG | OXYGEN SATURATION: 100 %

## 2021-05-14 VITALS
HEIGHT: 67 IN | OXYGEN SATURATION: 97 % | TEMPERATURE: 97.3 F | BODY MASS INDEX: 35.79 KG/M2 | HEART RATE: 64 BPM | SYSTOLIC BLOOD PRESSURE: 115 MMHG | DIASTOLIC BLOOD PRESSURE: 66 MMHG | WEIGHT: 228 LBS

## 2021-05-14 VITALS — OXYGEN SATURATION: 98 % | BODY MASS INDEX: 36.26 KG/M2 | HEIGHT: 67 IN | WEIGHT: 231 LBS | HEART RATE: 103 BPM

## 2021-05-14 VITALS
DIASTOLIC BLOOD PRESSURE: 76 MMHG | HEART RATE: 96 BPM | HEIGHT: 67 IN | OXYGEN SATURATION: 99 % | TEMPERATURE: 97.5 F | SYSTOLIC BLOOD PRESSURE: 126 MMHG

## 2021-05-14 NOTE — PROGRESS NOTES
Progress Note      Patient Name: Elaine Sprigler   Patient ID: [id number]   Sex: Female   YOB: 1960    Primary Care Provider: Darnell Umana MD   Referring Provider: Darnell Umana MD    Visit Date: February 17, 2021    Provider: Dwight Campbell DPM   Location: [facility]   Location Address: [address] [place] [address]  [id number]   Location Phone: [phone]          Chief Complaint  · Follow Up Office Visit S/P Surgery      History Of Present Illness  Elaine L. Sprigler is a 60 year old /White female who presents today for a postoperative visit.      Procedure: Left hardware removal, distal bunionectomies, left second toe hammertoe correction  Date: 1/8/2021    Patient states they are doing well without complications.  Patient states they are following post-op instructions.  Patient states pain is controlled.      Patient denies any fevers, chills, nausea, vomiting, shortness of breathe, nor any other constitutional signs nor symptoms.      Patient states compliance with nonweightbearing to surgical foot.    Patient states being pleased with postop results thus far.             Past Medical History  Abdominal Pain; Aftercare following surgery; Anemia; Bladder Disorder; Broken Bones; Bunion; Bunion; Corns and callus; Depression; Diverticulitis; Elevated glucose; Essential hypertension; Fatigue; Foot pain, left; Gastric Ulcer; GERD (gastroesophageal reflux disease); Headache; Heart Attack; Heart Disease; Heart Murmur; Hernia; High blood pressure; High cholesterol; Hyperlipemia; Hypertension; Hypothyroidism; Limb Swelling; Migraine Headaches; Mitral valve prolapse; Primary osteoarthritis of hip, right; Primary osteoarthritis of right hip; Reflux Disease; Right Hip Trochanteric Bursitis; Seasonal allergies; Stress Incontinence, Female; Syncope and collapse; Thyroid disease; Thyroid disorder; Thyroid Problems; Weight Gain, Abnormal         Past Surgical  History  Arthroscopic knee surgery, left; Artificial Joints/Limbs; Colonoscopy; Excision of Mortons neuroma; Eye Implant; Foot surgery; Gastric Sleeve; Hysterectomy; Joint Surgery; Knee Replacement; Tonsillectomy         Medication List  clotrimazole-betamethasone 1-0.05 % topical cream; nystatin 100,000 unit/gram topical powder; pravastatin 40 mg oral tablet; Singulair 10 mg oral tablet; spironolactone 25 mg oral tablet; Sudafed 12 Hour 120 mg oral tablet extended release; Tirosint 125 mcg oral capsule; Vitamin D3 125 mcg (5,000 unit) oral tablet; Zyrtec 10 mg oral tablet         Allergy List  morphine       Allergies Reconciled  Family Medical History  Stroke; Heart Disease; Diabetes, unspecified type; Hypothyroidism; Heart Attack (MI); Family history of certain chronic disabling diseases; arthritis; No family history of colorectal cancer; Family history of stroke; Family history of heart disease         Reproductive History   5 Para 0 0 0 3       Social History  Alcohol (Current - status unknown); Alcohol Use (Never); Claustophobic (Unknown); Customer Service; 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); lives with children; lives with spouse; ; Recreational Drug Use (Never); Sedentary; Tobacco (Former); Unemployed; ; Working         Immunizations  Name Date Admin   Hepatitis A 2019   Hepatitis A 2018   Influenza 2020   Influenza 2019   Influenza 2019   Influenza 10/17/2016   [id number] 2019         Review of Systems  · Constitutional  o Denies  o : fatigue, night sweats  · Eyes  o Denies  o : double vision, blurred vision  · HENT  o Denies  o : vertigo, recent head injury  · Cardiovascular  o Denies  o : chest pain, irregular heart beats  · Respiratory  o Denies  o : shortness of breath, productive cough  · Gastrointestinal  o Denies  o : nausea, vomiting  · Genitourinary  o Denies  o : dysuria, urinary retention  · Integument  o * See HPI  · Neurologic  o Denies  o :  "altered mental status, seizures  · Musculoskeletal  o * See HPI  · Endocrine  o Denies  o : cold intolerance, heat intolerance  · Heme-Lymph  o Denies  o : petechiae, lymph node enlargement or tenderness  · Allergic-Immunologic  o Denies  o : frequent illnesses      Vitals  Date Time BP Position Site L\R Cuff Size HR RR TEMP (F) WT  HT  BMI kg/m2 BSA m2 O2 Sat FR L/min FiO2        02/17/2021 07:27 /50 Sitting    74 - R  96.7 229lbs 0oz 5'  7\" 35.87 2.22 100 %            Physical Examination  · Constitutional  o Appearance  o : well developed, well-nourished, no obvious deformities present  · Cardiovascular  o Peripheral Vascular System  o :   § Pedal Pulses  § : pulses 2 + and symmetrical  § Extremities  § : no edema in lower extremities  · Skin and Subcutaneous Tissue  o General Inspection  o : Skin is noted to have normal texture and turgor, with no excrescences noted.   o Digits and Nails  o : The toenails are noted to be without disese.  · Neurologic  o Sensation  o : Epicritic sensations intact bilaterally.  · Left Ankle/Foot  o Inspection  o : Skin edges well-coapted with no signs of dehiscence. No hypertrophic scar formation. No signs of edema, erythema, lymphangitis, nor signs of infection.      In office IMAGING:  3 view, AP, MO, Lateral, Left foot.  Radiographs show distal 1st metatarsal shaft osteotomy which shows a bunionectomy with good post-operative position with single screw fixation, along with removal of previous hardware. Arthroplasty of second PIP joint is seen. First and second rays are in corrected rectus position. No osteolytic changes seen surrounding screw placement.  Increase in trabeculation seen across osteotomy sites.  Bone appears to continue to show improvement in trabeculation at the screw removal sites.  No other changes seen compared to previous views.      Patient using a knee roller for nonweightbearing to left foot.           Assessment  · Postoperative Exam Following " Surgery     V67.00      Plan  · Orders  o Xray foot left [facility] Preferred View ([id number]) - - 02/17/2021  · Medications  o Medications have been Reconciled  o Transition of Care or Provider Policy  · Instructions  o Follow up in 3 weeks. X-ray, will discharge   o Patient to monitor for recurrence of symptoms and to contact Dr. Mosquera office for a follow-up appointment.  o Electronically Identified Patient Education Materials Provided Electronically            Electronically Signed by: Dwight Campbell DPM -Author on February 17, 2021 07:35:17 AM

## 2021-05-14 NOTE — PROGRESS NOTES
Progress Note      Patient Name: Elaine Sprigler   Patient ID: [id number]   Sex: Female   YOB: 1960    Primary Care Provider: Darnell Umana MD   Referring Provider: Darnell Umana MD    Visit Date: January 13, 2021    Provider: Dwight Campbell DPM   Location: [facility]   Location Address: [address] [place] [address]  [id number]   Location Phone: [phone]          Chief Complaint  · Follow Up Office Visit S/P Surgery      History Of Present Illness  Elaine L. Sprigler is a 60 year old /White female who presents today for a postoperative visit.      Procedure: Left hardware removal, distal bunionectomies, left second toe hammertoe correction  Date: 1/8/2021    Patient states they are doing well without complications.  Patient states they are following post-op instructions.  Patient states pain is controlled.      Patient denies any fevers, chills, nausea, vomiting, shortness of breathe, nor any other constitutional signs nor symptoms.                 Past Medical History  Abdominal Pain; Anemia; Bladder Disorder; Broken Bones; Bunion; Corns and callus; Depression; Diverticulitis; Elevated glucose; Essential hypertension; Fatigue; Gastric Ulcer; GERD (gastroesophageal reflux disease); Headache; Heart Attack; Heart Disease; Heart Murmur; Hernia; High blood pressure; High cholesterol; Hyperlipemia; Hypertension; Hypothyroidism; Limb Swelling; Migraine Headaches; Mitral valve prolapse; Primary osteoarthritis of hip, right; Primary osteoarthritis of right hip; Reflux Disease; Right Hip Trochanteric Bursitis; Seasonal allergies; Stress Incontinence, Female; Syncope and collapse; Thyroid disease; Thyroid disorder; Thyroid Problems; Weight Gain, Abnormal         Past Surgical History  Arthroscopic knee surgery, left; Artificial Joints/Limbs; Colonoscopy; Excision of Mortons neuroma; Eye Implant; Foot surgery; Gastric Sleeve; Hysterectomy; Joint Surgery; Knee Replacement;  Tonsillectomy         Medication List  clotrimazole-betamethasone 1-0.05 % topical cream; nystatin 100,000 unit/gram topical powder; pravastatin 40 mg oral tablet; Singulair 10 mg oral tablet; spironolactone 25 mg oral tablet; Sudafed 12 Hour 120 mg oral tablet extended release; Tirosint 125 mcg oral capsule; Vitamin D3 125 mcg (5,000 unit) oral tablet; Zyrtec 10 mg oral tablet         Allergy List  morphine         Family Medical History  Stroke; Heart Disease; Diabetes, unspecified type; Hypothyroidism; Heart Attack (MI); Family history of certain chronic disabling diseases; arthritis; No family history of colorectal cancer; Family history of stroke; Family history of heart disease         Reproductive History   5 Para 0 0 0 3       Social History  Alcohol (Current - status unknown); Alcohol Use (Never); Claustophobic (Unknown); Customer Service; 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); lives with children; lives with spouse; ; Recreational Drug Use (Never); Sedentary; Tobacco (Former); Tobacco use; Unemployed; ; Working         Immunizations  Name Date Admin   Hepatitis A 2019   Hepatitis A 2018   Influenza 2020   Influenza 2019   Influenza 2019   Influenza 10/17/2016   [id number] 2019         Review of Systems  · Constitutional  o Denies  o : fatigue, night sweats  · Eyes  o Denies  o : double vision, blurred vision  · HENT  o Denies  o : vertigo, recent head injury  · Cardiovascular  o Denies  o : chest pain, irregular heart beats  · Respiratory  o Denies  o : shortness of breath, productive cough  · Gastrointestinal  o Denies  o : nausea, vomiting  · Genitourinary  o Denies  o : dysuria, urinary retention  · Integument  o * See HPI  · Neurologic  o Denies  o : altered mental status, seizures  · Musculoskeletal  o * See HPI  · Endocrine  o Denies  o : cold intolerance, heat intolerance  · Heme-Lymph  o Denies  o : petechiae, lymph node enlargement or  "tenderness  · Allergic-Immunologic  o Denies  o : frequent illnesses      Vitals  Date Time BP Position Site L\R Cuff Size HR RR TEMP (F) WT  HT  BMI kg/m2 BSA m2 O2 Sat FR L/min FiO2 HC       01/13/2021 09:10 /76 Sitting    96 - R  97.5  5'  7\"   99 %            Physical Examination  · Constitutional  o Appearance  o : well developed, well-nourished, no obvious deformities present  · Cardiovascular  o Peripheral Vascular System  o :   § Pedal Pulses  § : pulses 2 + and symmetrical  § Extremities  § : no edema in lower extremities  · Skin and Subcutaneous Tissue  o General Inspection  o : Skin is noted to have normal texture and turgor, with no excrescences noted.   o Digits and Nails  o : The toenails are noted to be without disese.  · Neurologic  o Sensation  o : Epicritic sensations intact bilaterally.  · Left Ankle/Foot  o Inspection  o : Dressing is dry and intact without signs of breakthrough. Surgical sites on the first and second rays show sutures intact with skin edges well-coapted with no signs of dehiscence. Healthy surgical skin edges. No drainage present. No edema, erythema, calor, lymphangitis, nor signs of infection seen. First and second toes are in proper rectus position.     In office IMAGING:  3 view, AP, MO, Lateral, Left foot.  Radiographs show distal 1st metatarsal shaft osteotomy which shows a bunionectomy with good post-operative position with single screw fixation, along with removal of previous hardware. Arthroplasty of second PIP joint is seen. First and second rays are in corrected rectus position. No osteolytic changes seen surrounding screw placement. No other changes seen compared to previous views.           Assessment  · Postoperative Exam Following Surgery     V67.00      Plan  · Orders  o Xray foot left [facility] Preferred View ([id number]) - - 01/14/2021  · Medications  o Medications have been Reconciled  o Transition of Care or Provider Policy  · Instructions  o Continue " nonweightbearing to left foot. The patient states understanding and agreement with this plan.   o Compressive dressing was applied to left foot.  o Patient to monitor for recurrence of symptoms and to contact Dr. Mosquera office for a follow-up appointment.  o Patient to follow-up in 2 weeks for suture removal. Transition to partial weightbearing with surgical shoe at that time.  o Electronically Identified Patient Education Materials Provided Electronically  · Disposition  o Call or Return if symptoms worsen or persist.            Electronically Signed by: Dwight Campbell DPM -Author on January 14, 2021 10:16:58 AM

## 2021-05-14 NOTE — PROGRESS NOTES
Progress Note      Patient Name: Elaine Sprigler   Patient ID: [id number]   Sex: Female   YOB: 1960    Primary Care Provider: Darnell Umana MD   Referring Provider: Darnell Umana MD    Visit Date: March 10, 2021    Provider: Dwight Campbell DPM   Location: [facility]   Location Address: [address] [place] [address]  [id number]   Location Phone: [phone]          Chief Complaint  · Follow Up Office Visit S/P Surgery      History Of Present Illness  Elaine L. Sprigler is a 60 year old /White female who presents today for a postoperative visit.      Procedure: Left hardware removal, distal bunionectomies, left second toe hammertoe correction  Date: 1/8/2021    Patient states they are doing well without complications.  Patient states they are following post-op instructions.  Patient states pain is controlled.      Patient denies any fevers, chills, nausea, vomiting, shortness of breathe, nor any other constitutional signs nor symptoms.      Patient states compliance with nonweightbearing to surgical foot.    Patient states being pleased with postop results thus far.    Pt states she went on a hike last week without difficulty.             Past Medical History  Abdominal Pain; Aftercare following surgery; Anemia; Bladder disorder; Broken Bones; Bunion; Bunion; Corns and callus; Depression; Diverticulitis; Elevated glucose; Essential hypertension; Fatigue; Foot pain, left; Gastric Ulcer; GERD (gastroesophageal reflux disease); Headache; Heart Attack; Heart Disease; Heart Murmur; Hernia; High blood pressure; High cholesterol; Hyperlipemia; Hypertension; Hypothyroidism; Limb Swelling; Migraine Headaches; Mitral valve prolapse; Primary osteoarthritis of hip, right; Primary osteoarthritis of right hip; Reflux Disease; Right Hip Trochanteric Bursitis; Seasonal allergies; Stress Incontinence, Female; Syncope and collapse; Thyroid disease; Thyroid disorder; Thyroid Problems;  Weight Gain, Abnormal         Past Surgical History  Arthroscopic knee surgery, left; Artificial Joints/Limbs; Colonoscopy; Excision of Mortons neuroma; Eye Implant; Foot surgery; Gastric Sleeve; Hysterectomy; Joint Surgery; Knee Replacement; Tonsillectomy         Medication List  nystatin 100,000 unit/gram topical powder; pravastatin 40 mg oral tablet; Singulair 10 mg oral tablet; spironolactone 25 mg oral tablet; Sudafed 12 Hour 120 mg oral tablet extended release; Tirosint 125 mcg oral capsule; Vitamin D3 125 mcg (5,000 unit) oral tablet; Zyrtec 10 mg oral tablet         Allergy List  morphine       Allergies Reconciled  Family Medical History  Stroke; Heart Disease; Diabetes, unspecified type; Hypothyroidism; Heart Attack (MI); Family history of certain chronic disabling diseases; arthritis; No family history of colorectal cancer; Family history of stroke; Family history of heart disease         Reproductive History   5 Para 0 0 0 3       Social History  Alcohol (Current - status unknown); Alcohol Use (Current some day); Claustophobic (Unknown); Customer Service; 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); lives with children; lives with spouse; ; .; Recreational Drug Use (Never); Sedentary; Tobacco (Never); Unemployed; ; Working         Immunizations  Name Date Admin   Hepatitis A 2019   Hepatitis A 2018   Influenza 2020   Influenza 2019   Influenza 2019   Influenza 10/17/2016   [id number] 2019         Review of Systems  · Constitutional  o Denies  o : fatigue, night sweats  · Eyes  o Denies  o : double vision, blurred vision  · HENT  o Denies  o : vertigo, recent head injury  · Cardiovascular  o Denies  o : chest pain, irregular heart beats  · Respiratory  o Denies  o : shortness of breath, productive cough  · Gastrointestinal  o Denies  o : nausea, vomiting  · Genitourinary  o Denies  o : dysuria, urinary retention  · Integument  o * See  "HPI  · Neurologic  o Denies  o : altered mental status, seizures  · Musculoskeletal  o * See HPI  · Endocrine  o Denies  o : cold intolerance, heat intolerance  · Heme-Lymph  o Denies  o : petechiae, lymph node enlargement or tenderness  · Allergic-Immunologic  o Denies  o : frequent illnesses      Vitals  Date Time BP Position Site L\R Cuff Size HR RR TEMP (F) WT  HT  BMI kg/m2 BSA m2 O2 Sat FR L/min FiO2        03/10/2021 07:42 /66 Sitting    64 - R  97.3 228lbs 0oz 5'  7\" 35.71 2.21 97 %            Physical Examination  · Constitutional  o Appearance  o : well developed, well-nourished, no obvious deformities present  · Cardiovascular  o Peripheral Vascular System  o :   § Pedal Pulses  § : pulses 2 + and symmetrical  § Extremities  § : no edema in lower extremities  · Skin and Subcutaneous Tissue  o General Inspection  o : Skin is noted to have normal texture and turgor, with no excrescences noted.   o Digits and Nails  o : The toenails are noted to be without disese.  · Neurologic  o Sensation  o : Epicritic sensations intact bilaterally.  · Left Ankle/Foot  o Inspection  o : Skin edges well-coapted with no signs of dehiscence. No hypertrophic scar formation. No signs of edema, erythema, lymphangitis, nor signs of infection.      In office IMAGING:  3 view, AP, MO, Lateral, Left foot.  Radiographs show distal 1st metatarsal shaft osteotomy which shows a bunionectomy with good post-operative position with single screw fixation, along with removal of previous hardware. Arthroplasty of second PIP joint is seen. First and second rays are in corrected rectus position. No osteolytic changes seen surrounding screw placement.  Continued improvement and increase in trabeculation seen across osteotomy sites.  Bone appears to continue to show improvement in trabeculation at the screw removal sites.  No other changes seen compared to previous views.      Pt walking in regular shoes on both feet. "           Assessment  · Postoperative Exam Following Surgery     V67.00      Plan  · Medications  o Medications have been Reconciled  o Transition of Care or Provider Policy  · Instructions  o Follow up as needed.  o Patient to monitor for recurrence of symptoms and to contact Dr. Mosquera office for a follow-up appointment.  o Patient may begin to weight bear as tolerated in supportive shoes. No impact activities for one month. After that time, the patient may increase activities as tolerated. Patient states understanding and agreement with this plan.  o Electronically Identified Patient Education Materials Provided Electronically  · Disposition  o Call or Return if symptoms worsen or persist.            Electronically Signed by: Dwight Campbell DPM -Author on March 10, 2021 07:54:31 AM

## 2021-05-14 NOTE — PROGRESS NOTES
Progress Note      Patient Name: Elaine Sprigler   Patient ID: [id number]   Sex: Female   YOB: 1960    Primary Care Provider: Darnell Umana MD   Referring Provider: Darnell Umana MD    Visit Date: March 1, 2021    Provider: Osito St MD   Location: [facility]   Location Address: [address] [place] [address]  [id number]   Location Phone: [phone]          Chief Complaint  · Bilateral knee pain       History Of Present Illness  Elaine L. Sprigler is a 60 year old /White female who presents today to [facility].      Patient presents today with a follow-up of bilateral knee pain. Patient has a history of a left total knee arthroplasty performed on 2/25/2020.  She has a history of a right total knee arthroplasty performed 10 years ago. She states she is doing well today and is here for her follow-up. She states she is happy with the results of her bilateral knee replacements.       Past Medical History  Abdominal Pain; Aftercare following surgery; Anemia; Bladder disorder; Broken Bones; Bunion; Bunion; Corns and callus; Depression; Diverticulitis; Elevated glucose; Essential hypertension; Fatigue; Foot pain, left; Gastric Ulcer; GERD (gastroesophageal reflux disease); Headache; Heart Attack; Heart Disease; Heart Murmur; Hernia; High blood pressure; High cholesterol; Hyperlipemia; Hypertension; Hypothyroidism; Limb Swelling; Migraine Headaches; Mitral valve prolapse; Primary osteoarthritis of hip, right; Primary osteoarthritis of right hip; Reflux Disease; Right Hip Trochanteric Bursitis; Seasonal allergies; Stress Incontinence, Female; Syncope and collapse; Thyroid disease; Thyroid disorder; Thyroid Problems; Weight Gain, Abnormal         Past Surgical History  Arthroscopic knee surgery, left; Artificial Joints/Limbs; Colonoscopy; Excision of Mortons neuroma; Eye Implant; Foot surgery; Gastric Sleeve; Hysterectomy; Joint Surgery; Knee Replacement; Tonsillectomy          Medication List  clotrimazole-betamethasone 1-0.05 % topical cream; nystatin 100,000 unit/gram topical powder; pravastatin 40 mg oral tablet; Singulair 10 mg oral tablet; spironolactone 25 mg oral tablet; Sudafed 12 Hour 120 mg oral tablet extended release; Tirosint 125 mcg oral capsule; Vitamin D3 125 mcg (5,000 unit) oral tablet; Zyrtec 10 mg oral tablet         Allergy List  morphine       Allergies Reconciled  Family Medical History  Stroke; Heart Disease; Diabetes, unspecified type; Hypothyroidism; Heart Attack (MI); Family history of certain chronic disabling diseases; arthritis; No family history of colorectal cancer; Family history of stroke; Family history of heart disease         Reproductive History   5 Para 0 0 0 3       Social History  Alcohol (Current - status unknown); Alcohol Use (Current some day); Claustophobic (Unknown); Customer Service; 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); lives with children; lives with spouse; ; .; Recreational Drug Use (Never); Sedentary; Tobacco (Never); Unemployed; ; Working         Immunizations  Name Date Admin   Hepatitis A 2019   Hepatitis A 2018   Influenza 2020   Influenza 2019   Influenza 2019   Influenza 10/17/2016   [id number] 2019         Review of Systems  · Constitutional  o Denies  o : fever, chills, weight loss  · Cardiovascular  o Denies  o : chest pain, shortness of breath  · Gastrointestinal  o Denies  o : liver disease, heartburn, nausea, blood in stools  · Genitourinary  o Denies  o : painful urination, blood in urine  · Integument  o Denies  o : rash, itching  · Neurologic  o Denies  o : headache, weakness, loss of consciousness  · Musculoskeletal  o Denies  o : painful, swollen joints  · Psychiatric  o Denies  o : drug/alcohol addiction, anxiety, depression      Vitals  Date Time BP Position Site L\R Cuff Size HR RR TEMP (F) WT  HT  BMI kg/m2 BSA m2 O2 Sat FR L/min FiO2       "  03/01/2021 03:48 PM      103 - R   231lbs 0oz 5'  7\" 36.18 2.23 98 %            Physical Examination  · Constitutional  o Appearance  o : well developed, well-nourished, no obvious deformities present  · Head and Face  o Head  o :   § Inspection  § : normocephalic  o Face  o :   § Inspection  § : no facial lesions  · Eyes  o Conjunctivae  o : conjunctivae normal  o Sclerae  o : sclerae white  · Ears, Nose, Mouth and Throat  o Ears  o :   § External Ears  § : appearance within normal limits  § Hearing  § : intact  o Nose  o :   § External Nose  § : appearance normal  · Neck  o Inspection/Palpation  o : normal appearance  o Range of Motion  o : full range of motion  · Respiratory  o Respiratory Effort  o : breathing unlabored  o Inspection of Chest  o : normal appearance  o Auscultation of Lungs  o : no audible wheezing or rales  · Cardiovascular  o Heart  o : regular rate  · Gastrointestinal  o Abdominal Examination  o : soft and non-tender  · Skin and Subcutaneous Tissue  o General Inspection  o : intact, no rashes  · Psychiatric  o General  o : Alert and oriented x3  o Judgement and Insight  o : judgment and insight intact  o Mood and Affect  o : mood normal, affect appropriate  · Right Knee  o Inspection  o : Sensation grossly intact. Neurovascular intact. Skin intact. Calf supple, non-tender. Well healed scars. No swelling, skin discoloration or atrophy. Full flexion and extension. Hyperextension of knees. Ambulation with a non-antalgic gait. Negative posterior sag. Stable to valgus/varus stress. Good strength in quadriceps, hamstrings, dorsiflexors, and plantar flexors. Well tracking patella. Non-tender medial joint line. Non-tender lateral joint line.   · Left Knee  o Inspection  o : Sensation grossly intact. Neurovascular intact. Skin intact. Calf supple, non-tender. Well healed scars. No swelling, skin discoloration or atrophy. Full flexion and extension. Hyperextension of knees. Ambulation with a " non-antalgic gait. Negative posterior sag. Stable to valgus/varus stress. Good strength in quadriceps, hamstrings, dorsiflexors, and plantar flexors. Well tracking patella. Non-tender medial joint line. Non-tender lateral joint line.   · In Office Procedures  o View  o : AP/LATERAL/SUNRISE  o Site  o : bilateral, knee  o Indication  o : Bilateral knee pain   o Study  o : X-rays ordered, taken in the office, and reviewed today.  o Xray  o : Intact bilateral total knee replacements with good alignment and no signs of wearing or loosening.   o Comparative Data  o : No comparative data found              Assessment  · Pain in both knees, unspecified chronicity       Pain in right knee     719.46/M25.561  Pain in left knee     719.46/M25.562  · History of total knee arthroplasty, bilateral     V43.65/Z96.653      Plan  · Orders  o Knee (Left) [facility] Preferred View ([id number]) - 719.46/M25.562 - 03/01/2021  o Knee (Right) [facility] Preferred View ([id number]) - 719.46/M25.561 - 03/01/2021  · Medications  o Medications have been Reconciled  o Transition of Care or Provider Policy  · Instructions  o Dr. St saw and examined the patient and agrees with plan.   o X-rays reviewed by Dr. St.  o Reviewed the patient's Past Medical, Social, and Family history as well as the ROS at today's visit, no changes.  o Call or return if worsening symptoms.  o Follow Up PRN.  o Patient is here for her yearly checkups. Patient will follow-up in 2 years.  o The above service was scribed by Leela Kapadia on my behalf and I attest to the accuracy of the note. [email or web address]            Electronically Signed by: Leela Kapadia-, Other -Author on March 4, 2021 10:46:44 AM  Electronically Co-signed by: Osito St MD -Reviewer on March 7, 2021 09:44:43 AM

## 2021-05-14 NOTE — PROGRESS NOTES
Progress Note      Patient Name: Elaine Sprigler   Patient ID: [id number]   Sex: Female   YOB: 1960    Primary Care Provider: Darnell Umana MD   Referring Provider: Darnell Umana MD    Visit Date: January 27, 2021    Provider: Dwight Campbell DPM   Location: [facility]   Location Address: [address] [place] [address]  [id number]   Location Phone: [phone]          Chief Complaint  · Follow Up Office Visit S/P Surgery      History Of Present Illness  Elaine L. Sprigler is a 60 year old /White female who presents today for a postoperative visit.      Procedure: Left hardware removal, distal bunionectomies, left second toe hammertoe correction  Date: 1/8/2021    Patient states they are doing well without complications.  Patient states they are following post-op instructions.  Patient states pain is controlled.      Patient denies any fevers, chills, nausea, vomiting, shortness of breathe, nor any other constitutional signs nor symptoms.      Patient states compliance with nonweightbearing to surgical foot.             Past Medical History  Abdominal Pain; Aftercare following surgery; Anemia; Bladder Disorder; Broken Bones; Bunion; Bunion; Corns and callus; Depression; Diverticulitis; Elevated glucose; Essential hypertension; Fatigue; Foot pain, left; Gastric Ulcer; GERD (gastroesophageal reflux disease); Headache; Heart Attack; Heart Disease; Heart Murmur; Hernia; High blood pressure; High cholesterol; Hyperlipemia; Hypertension; Hypothyroidism; Limb Swelling; Migraine Headaches; Mitral valve prolapse; Primary osteoarthritis of hip, right; Primary osteoarthritis of right hip; Reflux Disease; Right Hip Trochanteric Bursitis; Seasonal allergies; Stress Incontinence, Female; Syncope and collapse; Thyroid disease; Thyroid disorder; Thyroid Problems; Weight Gain, Abnormal         Past Surgical History  Arthroscopic knee surgery, left; Artificial Joints/Limbs;  Colonoscopy; Excision of Mortons neuroma; Eye Implant; Foot surgery; Gastric Sleeve; Hysterectomy; Joint Surgery; Knee Replacement; Tonsillectomy         Medication List  clotrimazole-betamethasone 1-0.05 % topical cream; nystatin 100,000 unit/gram topical powder; pravastatin 40 mg oral tablet; Singulair 10 mg oral tablet; spironolactone 25 mg oral tablet; Sudafed 12 Hour 120 mg oral tablet extended release; Tirosint 125 mcg oral capsule; Vitamin D3 125 mcg (5,000 unit) oral tablet; Zyrtec 10 mg oral tablet         Allergy List  morphine       Allergies Reconciled  Family Medical History  Stroke; Heart Disease; Diabetes, unspecified type; Hypothyroidism; Heart Attack (MI); Family history of certain chronic disabling diseases; arthritis; No family history of colorectal cancer; Family history of stroke; Family history of heart disease         Reproductive History   5 Para 0 0 0 3       Social History  Alcohol (Current - status unknown); Alcohol Use (Never); Claustophobic (Unknown); Customer Service; 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); lives with children; lives with spouse; ; Recreational Drug Use (Never); Sedentary; Tobacco (Former); Unemployed; ; Working         Immunizations  Name Date Admin   Hepatitis A 2019   Hepatitis A 2018   Influenza 2020   Influenza 2019   Influenza 2019   Influenza 10/17/2016   [id number] 2019         Review of Systems  · Constitutional  o Denies  o : fatigue, night sweats  · Eyes  o Denies  o : double vision, blurred vision  · HENT  o Denies  o : vertigo, recent head injury  · Cardiovascular  o Denies  o : chest pain, irregular heart beats  · Respiratory  o Denies  o : shortness of breath, productive cough  · Gastrointestinal  o Denies  o : nausea, vomiting  · Genitourinary  o Denies  o : dysuria, urinary retention  · Integument  o * See HPI  · Neurologic  o Denies  o : altered mental status, seizures  · Musculoskeletal  o * See  "HPI  · Endocrine  o Denies  o : cold intolerance, heat intolerance  · Heme-Lymph  o Denies  o : petechiae, lymph node enlargement or tenderness  · Allergic-Immunologic  o Denies  o : frequent illnesses      Vitals  Date Time BP Position Site L\R Cuff Size HR RR TEMP (F) WT  HT  BMI kg/m2 BSA m2 O2 Sat FR L/min FiO2 HC       01/27/2021 08:17 /76 Sitting    87 - R  96.7  5'  7\"   100 %            Physical Examination  · Constitutional  o Appearance  o : well developed, well-nourished, no obvious deformities present  · Cardiovascular  o Peripheral Vascular System  o :   § Pedal Pulses  § : pulses 2 + and symmetrical  § Extremities  § : no edema in lower extremities  · Skin and Subcutaneous Tissue  o General Inspection  o : Skin is noted to have normal texture and turgor, with no excrescences noted.   o Digits and Nails  o : The toenails are noted to be without disese.  · Neurologic  o Sensation  o : Epicritic sensations intact bilaterally.  · Left Ankle/Foot  o Inspection  o : Upon removal of sutures, skin edges remain well-coapted with no signs of dehiscence. No signs of edema, erythema, lymphangitis, nor signs of infection.      In office IMAGING:  3 view, AP, MO, Lateral, Left foot.  Radiographs show distal 1st metatarsal shaft osteotomy which shows a bunionectomy with good post-operative position with single screw fixation, along with removal of previous hardware. Arthroplasty of second PIP joint is seen. First and second rays are in corrected rectus position. No osteolytic changes seen surrounding screw placement.  Early trabeculation seen across osteotomy sites.  Bone appears to be filling in with increase trabeculation at the screw removal sites.  No other changes seen compared to previous views.      Patient using a knee roller for nonweightbearing to left foot.           Assessment  · Postoperative Exam Following Surgery     V67.00      Plan  · Orders  o Xray foot left [facility] Preferred View ([id number]) - - " 01/27/2021  · Medications  o Medications have been Reconciled  o Transition of Care or Provider Policy  · Instructions  o Follow up in 3 weeks. X-ray, transition to regular shoe gear. No impact x1 month.  o Patient to monitor for recurrence of symptoms and to contact Dr. Mosquera office for a follow-up appointment.  o Electronically Identified Patient Education Materials Provided Electronically  · Disposition  o Call or Return if symptoms worsen or persist.            Electronically Signed by: Dwight Campbell DPM -Author on January 27, 2021 08:49:29 AM

## 2021-05-14 NOTE — PROGRESS NOTES
Progress Note      Patient Name: Elaine Sprigler   Patient ID: [id number]   Sex: Female   YOB: 1960    Primary Care Provider: Darnell Umana MD   Referring Provider: Darnell Umana MD    Visit Date: January 4, 2021    Provider: Dwight Campbell DPM   Location: [facility]   Location Address: [address] [place] [address]  [id number]   Location Phone: [phone]          Chief Complaint  · Left Foot Pain  · Surgical History and Physical      History Of Present Illness  Elaine L. Sprigler is a 60 year old /White female who presents to the [facility] today new patient referred from Darnell Umana MD.   Patient Name: Elaine L. Sprigler   Allergies: morphine   Cheif Complaint/HPI: Left 1st MPJ Bunion and Left 2nd hammertoe   Current Medicaitons: MEDICATION LIST   Past Medical History: PAST MEDICAL HISTORY   Relevent Social History: None   Tobacco Use: Does not smoke   Psychological History: Within Normal Limits   HPI     New, Established, New Problem:  est  Location:  Left 1st and 2nd toe  Duration:  2000  Onset:  post-op from injury and bunionectomy in 2000 in [place]  Nature:  sore  Stable, worsening, improving:  worsening    Aggravating factors:   Patient relates pain is aggravated by shoe gear and ambulation.   Previous Treatment:  ORIF from fracture in 2000.    Patient denies any fevers, chills, nausea, vomiting, shortness of breathe, nor any other constitutional signs nor symptoms.    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    Upon discussion of non-surgical conservative option, surgical correction, post-operative requirements along with risk and benefits of the surgery along with expected outcomes, the patient states they would like to proceed with the scheduling surgery.       Past Medical History  Abdominal Pain; Anemia; Bladder Disorder; Broken Bones; Bunion; Corns and callus; Depression; Diverticulitis; Elevated glucose; Essential hypertension; Fatigue; Gastric  Ulcer; GERD (gastroesophageal reflux disease); Headache; Heart Attack; Heart Disease; Heart Murmur; Hernia; High blood pressure; High cholesterol; Hyperlipemia; Hypertension; Hypothyroidism; Limb Swelling; Migraine Headaches; Mitral valve prolapse; Primary osteoarthritis of hip, right; Primary osteoarthritis of right hip; Reflux Disease; Right Hip Trochanteric Bursitis; Seasonal allergies; Stress Incontinence, Female; Syncope and collapse; Thyroid disease; Thyroid disorder; Thyroid Problems; Weight Gain, Abnormal         Past Surgical History  Arthroscopic knee surgery, left; Artificial Joints/Limbs; Colonoscopy; Excision of Mortons neuroma; Eye Implant; Foot surgery; Gastric Sleeve; Hysterectomy; Joint Surgery; Knee Replacement; Tonsillectomy         Medication List  clotrimazole-betamethasone 1-0.05 % topical cream; nystatin 100,000 unit/gram topical powder; pravastatin 40 mg oral tablet; Singulair 10 mg oral tablet; spironolactone 25 mg oral tablet; Sudafed 12 Hour 120 mg oral tablet extended release; Tirosint 125 mcg oral capsule; Vitamin D3 125 mcg (5,000 unit) oral tablet; Zyrtec 10 mg oral tablet         Allergy List  morphine         Family Medical History  Stroke; Heart Disease; Diabetes, unspecified type; Hypothyroidism; Heart Attack (MI); Family history of certain chronic disabling diseases; arthritis; No family history of colorectal cancer; Family history of stroke; Family history of heart disease         Reproductive History   5 Para 0 0 0 3       Social History  Alcohol (Current - status unknown); Alcohol Use (Never); Claustophobic (Unknown); Customer Service; 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); lives with children; lives with spouse; ; Recreational Drug Use (Never); Sedentary; Tobacco (Former); Tobacco use (Current every day); Unemployed; ; Working         Immunizations  Name Date Admin   Hepatitis A 2019   Hepatitis A 2018   Influenza 2020   Influenza 2019  "  Influenza 02/18/2019   Influenza 10/17/2016   Rgmzzisdu50 11/22/2019         Review of Systems  · Constitutional  o Denies  o : fatigue, night sweats  · Eyes  o Denies  o : double vision, blurred vision  · HENT  o Denies  o : vertigo, recent head injury  · Cardiovascular  o Denies  o : chest pain, irregular heart beats  · Respiratory  o Denies  o : shortness of breath, productive cough  · Gastrointestinal  o Denies  o : nausea, vomiting  · Genitourinary  o Denies  o : dysuria, urinary retention  · Integument  o Denies  o : hair growth change, new skin lesions  · Neurologic  o Denies  o : altered mental status, seizures  · Musculoskeletal  o * See HPI  · Endocrine  o Denies  o : cold intolerance, heat intolerance  · Heme-Lymph  o Denies  o : petechiae, lymph node enlargement or tenderness  · Allergic-Immunologic  o Denies  o : frequent illnesses      Vitals  Date Time BP Position Site L\R Cuff Size HR RR TEMP (F) WT  HT  BMI kg/m2 BSA m2 O2 Sat FR L/min FiO2 HC       01/04/2021 08:08 /76 Sitting    82 - R  97.3 224lbs 6oz 5'  7\" 35.14 2.19 100 %            Physical Examination  · Constitutional  o Appearance  o : well developed, well-nourished, no obvious deformities present  · Cardiovascular  o Peripheral Vascular System  o :   § Pedal Pulses  § : pulses 2 + and symmetrical  § Extremities  § : no edema in lower extremities  · Musculoskeletal  o General  o :   § General Musculoskeletal  § : Lower extremity muscle and strength and range of motion is equal and symmetrical bilaterally. The knees are noted to be normal in alignment. Left Medial deviation of the first metatarsal with associated lateral deviation of the hallux at the metatarsal phalangeal joint and reducible Left 2nd contracted hammertoe. No signs of edema, erythema, lymphangitis, nor signs of infection.   · Skin and Subcutaneous Tissue  o General Inspection  o : Skin is noted to have normal texture and turgor, with no excrescences noted. "   o Digits and Nails  o : The toenails are noted to be without disease.  · Neurologic  o Sensation  o : Epicritic sensations intact bilaterally.                Assessment  · Foot pain, left     729.5/M79.672  · Hammertoe of left foot     735.4/M20.42  · Hallux abducto valgus, left     735.0/M20.12  · Preoperative examination     V72.84/Z01.818      Plan  · Orders  o SAQIB Report (KASPR) - - 01/04/2021  o Sang bunionectomy (96425, 17988) - - 01/04/2021  o Bunionectomy, Vega (62671, 21649) - - 01/04/2021  o Arthrodesis of left second toe (73930, 11838) - - 01/04/2021  o Flexor tenolysis of foot (43981) - - 01/04/2021  o Capsulotomy of metatarsophalangeal joint (80048, 72600) - - 01/04/2021  o [facility] Pre-Op Covid-19 Screening (93403) - - 01/04/2021  · Medications  o Medications have been Reconciled  o Transition of Care or Provider Policy  · Instructions  o PLAN: Left Distal Bunionectomy and Left Second Hammertoe Correction  o Handouts Provided-Pre-Procedure Instructions including date and time and location of procedure.  o Surgical Facility: [facility]  o ****Surgical Orders****  o ****Patient Status****  o Outpatient  o ********************  o RISK AND BENEFITS:  o Consent for surgery: Given these options, the patient has verbally expressed an understanding of the risks of surgery and finds these risks acceptable. We will proceed with surgery as soon as possible.  o Consult Anesthesia for an post-operative block, or any pain management procedure deemed necessary by the anestesiologist for adequate post-operative pain control.   o O.R. PREP: Per protocol  o IV: Per Anesthesia  o IV: LR@ 75ml/hr  o PLEASE SIGN PERMIT FOR: PLAN: Left Distal Bunionectomy and Left Second Hammertoe Correction  o *******************************************  o PRE- OP MEDICATION ORDER:  o *******************************************  o *__Kefzol 2 gram IV on call to OR.  o *___The above History and Physical Examination has  been completed within 30 days of admission.  o Pre-Admission Testing Date:   o Follow up post surgery in 4 days.  o Discuss Findings: I have discussed the findings of this evaluation with the patient. The discussion included a complete verbal explanation of any changes in the examination results, diagnosis, and the current treatment plan. A schedule for future care needs was explained. If any questions should arise after returning home, I have encouraged the patient to feel free to contact Dr. Campbell. The patient states understanding and agreement with this plan.  o Procedure: Left distal bunionectomy and Left 2nd toe arthroplasty of DIPJ/PIPJ/MPJ. Upon discussion of non-surgical conservative option, surgical correction, post-operative requirements along with risk and benefits of the surgery along with expected outcomes, the patient states they would like to proceed with the scheduling surgery.  o Electronically Identified Patient Education Materials Provided Electronically  · Disposition  o Call or Return if symptoms worsen or persist.            Electronically Signed by: Dwight Campbell DPM -Author on January 4, 2021 05:08:59 PM

## 2021-05-15 VITALS
HEIGHT: 67 IN | DIASTOLIC BLOOD PRESSURE: 70 MMHG | WEIGHT: 223 LBS | BODY MASS INDEX: 35 KG/M2 | HEART RATE: 75 BPM | SYSTOLIC BLOOD PRESSURE: 125 MMHG

## 2021-05-15 VITALS
DIASTOLIC BLOOD PRESSURE: 78 MMHG | SYSTOLIC BLOOD PRESSURE: 135 MMHG | TEMPERATURE: 97.2 F | BODY MASS INDEX: 33.35 KG/M2 | HEIGHT: 68 IN | OXYGEN SATURATION: 97 % | HEART RATE: 72 BPM | WEIGHT: 220.06 LBS

## 2021-05-15 VITALS — BODY MASS INDEX: 35 KG/M2 | HEART RATE: 73 BPM | WEIGHT: 223 LBS | HEIGHT: 67 IN | OXYGEN SATURATION: 98 %

## 2021-05-15 VITALS — HEIGHT: 67 IN | WEIGHT: 215 LBS | OXYGEN SATURATION: 98 % | HEART RATE: 72 BPM | BODY MASS INDEX: 33.74 KG/M2

## 2021-05-15 VITALS
SYSTOLIC BLOOD PRESSURE: 127 MMHG | HEART RATE: 78 BPM | DIASTOLIC BLOOD PRESSURE: 66 MMHG | HEIGHT: 68 IN | TEMPERATURE: 97.4 F | OXYGEN SATURATION: 98 % | BODY MASS INDEX: 34.18 KG/M2 | WEIGHT: 225.56 LBS

## 2021-05-15 VITALS — OXYGEN SATURATION: 99 % | HEIGHT: 67 IN | HEART RATE: 94 BPM | BODY MASS INDEX: 33.47 KG/M2 | WEIGHT: 213.25 LBS

## 2021-05-15 VITALS — OXYGEN SATURATION: 97 % | WEIGHT: 215 LBS | HEIGHT: 68 IN | BODY MASS INDEX: 32.58 KG/M2 | HEART RATE: 68 BPM

## 2021-05-15 VITALS
WEIGHT: 214.06 LBS | SYSTOLIC BLOOD PRESSURE: 120 MMHG | HEART RATE: 91 BPM | BODY MASS INDEX: 32.44 KG/M2 | DIASTOLIC BLOOD PRESSURE: 63 MMHG | HEIGHT: 68 IN | OXYGEN SATURATION: 98 % | TEMPERATURE: 98.2 F

## 2021-05-16 VITALS
DIASTOLIC BLOOD PRESSURE: 62 MMHG | HEIGHT: 68 IN | OXYGEN SATURATION: 97 % | SYSTOLIC BLOOD PRESSURE: 114 MMHG | HEART RATE: 69 BPM | BODY MASS INDEX: 33.95 KG/M2 | WEIGHT: 224 LBS | TEMPERATURE: 97.5 F

## 2021-05-16 VITALS
OXYGEN SATURATION: 98 % | WEIGHT: 207.5 LBS | SYSTOLIC BLOOD PRESSURE: 130 MMHG | TEMPERATURE: 97.4 F | HEART RATE: 72 BPM | BODY MASS INDEX: 31.45 KG/M2 | HEIGHT: 68 IN | DIASTOLIC BLOOD PRESSURE: 81 MMHG

## 2021-05-16 VITALS
SYSTOLIC BLOOD PRESSURE: 122 MMHG | BODY MASS INDEX: 31.99 KG/M2 | HEIGHT: 68 IN | DIASTOLIC BLOOD PRESSURE: 81 MMHG | WEIGHT: 211.06 LBS | TEMPERATURE: 98.1 F | OXYGEN SATURATION: 98 % | HEART RATE: 83 BPM

## 2021-05-24 ENCOUNTER — HOSPITAL ENCOUNTER (OUTPATIENT)
Dept: LAB | Facility: HOSPITAL | Age: 61
Discharge: HOME | End: 2021-05-24
Attending: FAMILY MEDICINE

## 2021-05-24 LAB
25(OH)D3 SERPL-MCNC: 56.7 NG/ML (ref 30–100)
ALBUMIN SERPL-MCNC: 4.6 G/DL (ref 3.5–5)
ALBUMIN/GLOB SERPL: 1.8 {RATIO} (ref 1.4–2.6)
ALP SERPL-CCNC: 84 U/L (ref 53–141)
ALT SERPL-CCNC: 12 U/L (ref 10–40)
ANION GAP SERPL CALC-SCNC: 15 MMOL/L (ref 8–19)
AST SERPL-CCNC: 17 U/L (ref 15–50)
BASOPHILS # BLD AUTO: 0.04 10*3/UL (ref 0–0.2)
BASOPHILS NFR BLD AUTO: 0.6 % (ref 0–3)
BILIRUB SERPL-MCNC: 0.3 MG/DL (ref 0.2–1.3)
BUN SERPL-MCNC: 12 MG/DL (ref 5–25)
BUN/CREAT SERPL: 13 {RATIO} (ref 6–20)
CALCIUM SERPL-MCNC: 9.8 MG/DL (ref 8.7–10.4)
CHLORIDE SERPL-SCNC: 104 MMOL/L (ref 99–111)
CHOLEST SERPL-MCNC: 176 MG/DL (ref 107–200)
CHOLEST/HDLC SERPL: 3.5 {RATIO} (ref 3–6)
CONV ABS IMM GRAN: 0.01 10*3/UL (ref 0–0.2)
CONV CO2: 26 MMOL/L (ref 22–32)
CONV IMMATURE GRAN: 0.2 % (ref 0–1.8)
CONV TOTAL PROTEIN: 7.2 G/DL (ref 6.3–8.2)
CREAT UR-MCNC: 0.89 MG/DL (ref 0.5–0.9)
DEPRECATED RDW RBC AUTO: 40.4 FL (ref 36.4–46.3)
EOSINOPHIL # BLD AUTO: 0.27 10*3/UL (ref 0–0.7)
EOSINOPHIL # BLD AUTO: 4.3 % (ref 0–7)
ERYTHROCYTE [DISTWIDTH] IN BLOOD BY AUTOMATED COUNT: 12.8 % (ref 11.7–14.4)
FOLATE SERPL-MCNC: 9.3 NG/ML (ref 4.8–20)
GFR SERPLBLD BASED ON 1.73 SQ M-ARVRAT: >60 ML/MIN/{1.73_M2}
GLOBULIN UR ELPH-MCNC: 2.6 G/DL (ref 2–3.5)
GLUCOSE SERPL-MCNC: 83 MG/DL (ref 65–99)
HCT VFR BLD AUTO: 40.2 % (ref 37–47)
HDLC SERPL-MCNC: 51 MG/DL (ref 40–60)
HGB BLD-MCNC: 13.3 G/DL (ref 12–16)
IRON SATN MFR SERPL: 28 % (ref 20–55)
IRON SERPL-MCNC: 97 UG/DL (ref 60–170)
LDLC SERPL CALC-MCNC: 108 MG/DL (ref 70–100)
LYMPHOCYTES # BLD AUTO: 2.18 10*3/UL (ref 1–5)
LYMPHOCYTES NFR BLD AUTO: 35.1 % (ref 20–45)
MCH RBC QN AUTO: 28.8 PG (ref 27–31)
MCHC RBC AUTO-ENTMCNC: 33.1 G/DL (ref 33–37)
MCV RBC AUTO: 87 FL (ref 81–99)
MONOCYTES # BLD AUTO: 0.61 10*3/UL (ref 0.2–1.2)
MONOCYTES NFR BLD AUTO: 9.8 % (ref 3–10)
NEUTROPHILS # BLD AUTO: 3.1 10*3/UL (ref 2–8)
NEUTROPHILS NFR BLD AUTO: 50 % (ref 30–85)
NRBC CBCN: 0 % (ref 0–0.7)
OSMOLALITY SERPL CALC.SUM OF ELEC: 291 MOSM/KG (ref 273–304)
PLATELET # BLD AUTO: 287 10*3/UL (ref 130–400)
PMV BLD AUTO: 9.7 FL (ref 9.4–12.3)
POTASSIUM SERPL-SCNC: 4.4 MMOL/L (ref 3.5–5.3)
RBC # BLD AUTO: 4.62 10*6/UL (ref 4.2–5.4)
SODIUM SERPL-SCNC: 141 MMOL/L (ref 135–147)
T4 FREE SERPL-MCNC: 1.5 NG/DL (ref 0.9–1.8)
TIBC SERPL-MCNC: 346 UG/DL (ref 245–450)
TRANSFERRIN SERPL-MCNC: 242 MG/DL (ref 250–380)
TRIGL SERPL-MCNC: 87 MG/DL (ref 40–150)
TSH SERPL-ACNC: 0.7 M[IU]/L (ref 0.27–4.2)
VIT B12 SERPL-MCNC: 463 PG/ML (ref 211–911)
VLDLC SERPL-MCNC: 17 MG/DL (ref 5–37)
WBC # BLD AUTO: 6.21 10*3/UL (ref 4.8–10.8)

## 2021-05-27 ENCOUNTER — CONVERSION ENCOUNTER (OUTPATIENT)
Dept: FAMILY MEDICINE CLINIC | Facility: CLINIC | Age: 61
End: 2021-05-27

## 2021-05-27 ENCOUNTER — OFFICE VISIT CONVERTED (OUTPATIENT)
Dept: FAMILY MEDICINE CLINIC | Facility: CLINIC | Age: 61
End: 2021-05-27
Attending: FAMILY MEDICINE

## 2021-05-27 ENCOUNTER — HOSPITAL ENCOUNTER (OUTPATIENT)
Dept: GENERAL RADIOLOGY | Facility: HOSPITAL | Age: 61
Discharge: HOME | End: 2021-05-27
Attending: FAMILY MEDICINE

## 2021-06-05 NOTE — PROGRESS NOTES
Progress Note      Patient Name: Elaine Sprigler   Patient ID: [id number]   Sex: Female   YOB: 1960    Primary Care Provider: aDrnell Umana MD   Referring Provider: Darnell Umana MD    Visit Date: May 27, 2021    Provider: Darnell Umana MD   Location: [facility]   Location Address: [address] [place] [address], [address]  [place], [place]  [id number]   Location Phone: [phone]          Chief Complaint     6 month follow up       History Of Present Illness  Elaine L. Sprigler is a 60 year old /White female who presents for evaluation and treatment of:      Pt presents for f/u .      hx of HLD. controlled    hx of fatigue with hypothyridism. SHe is now estabished with Dr. Ann, and is on tirosent. Controlled.    She has a gastric sleeve in 2016.       Hx of allergic rhiniitis.controlled.     Hx of vitamin D def. contctrolled.     She c/o right hip pain over several months..achiness/sharp pain with walking, and certain movements.    hx of heart murmur. She is due to see Dr. Pappas again.       Past Medical History  Disease Name Date Onset Notes   Abdominal pain --  --    Aftercare following surgery 01/27/2021 --    Anemia --  --    Bladder disorder --  --    Broken Bones --  --    Bunion --  --    Bunion 01/27/2021 --    Corns and callus --  --    Depression --  --    Diverticulitis --  --    Elevated glucose 02/11/2013 --    Essential hypertension --  --    Fatigue --  --    Foot pain, left 01/27/2021 --    Gastric Ulcer --  --    GERD (gastroesophageal reflux disease) --  --    Headache --  --    Heart Attack --  --    Heart Disease --  --    Heart Murmur --  --    Hernia --  --    High blood pressure --  --    High cholesterol --  --    Hyperlipemia --  --    Hypertension --  --    Hypothyroidism --  --    Limb Swelling --  --    Migraine Headaches --  --    Mitral valve prolapse --  --    Primary osteoarthritis of hip, right 10/10/2017 --    Primary osteoarthritis of  right hip 09/20/2017 --    Reflux Disease --  --    Right Hip Trochanteric Bursitis 09/20/2017 --    Seasonal allergies --  --    Stress Incontinence, Female --  --    Syncope and collapse --  --    Thyroid disease --  --    Thyroid disorder --  --    Thyroid Problems --  --    Weight Gain, Abnormal --  --          Past Surgical History  Procedure Name Date Notes   Arthroscopic knee surgery, left --  --    Artificial Joints/Limbs --  --    Colonoscopy 2002 --    Excision of Mortons neuroma --  --    Eye Implant --  yes   Foot surgery --  left foot - plate   Gastric Sleeve --  --    Hysterectomy --  --    Joint Surgery --  --    Knee Replacement --  --    Tonsillectomy --  --          Medication List  Name Date Started Instructions   nystatin 100,000 unit/gram topical powder 11/24/2020 apply to the affected area(s) by topical route 2 times per day   pravastatin 40 mg oral tablet 11/24/2020 take 1 tablet (40 mg) by oral route once daily at bedtime for 90 days   Singulair 10 mg oral tablet 11/24/2020 take 1 tablet (10 mg) by oral route once daily in the evening for 90 days   spironolactone 25 mg oral tablet 11/24/2020 take 1 tablet (25 mg) by oral route once daily for 90 days   Sudafed 12 Hour 120 mg oral tablet extended release 11/24/2020 take 1 tablet (120 mg) by oral route every 12 hours as needed   Tirosint 125 mcg oral capsule  take 1 capsule (125 mcg) by oral route once daily   Vitamin D3 125 mcg (5,000 unit) oral tablet 11/24/2020 take 1 tablet by oral route daily for 90 days   Zyrtec 10 mg oral tablet 11/24/2020 take 1 tablet (10 mg) by oral route once daily for 90 days         Allergy List  Allergen Name Date Reaction Notes   morphine --  --  --        Allergies Reconciled  Family Medical History  Disease Name Relative/Age Notes   Stroke Mother/   Mother  Mother  grandparents   Heart Disease Father/   Father  Father  Mother  aunt/uncle   Diabetes, unspecified type Brother/  Mother/   Mother; Brother   Mother; Brother  Mother  bro/sis   Hypothyroidism Sister/   --    Heart Attack (MI)  aunt/uncle   Family history of certain chronic disabling diseases; arthritis Mother/   Mother   No family history of colorectal cancer  --    Family history of stroke Mother/   Mother   Family history of heart disease Father/   Father         Reproductive History  Menstrual   Certainty of LMP Date: Jan 2001   Pregnancy Summary   Total Pregnancies: 5 Full Term: 0 Premature: 0   Ab Induced: 0 Ab Spontaneous: 0 Ectopics: 0   Multiples: 0 Living: 3         Social History  Finding Status Start/Stop Quantity Notes   Alcohol Current - status unknown --/-- --  wine   Alcohol Use Current some day --/-- --  rarely drinks, less than 1 drink per day, has been drinking for 21-30 years   Claustophobic Unknown --/-- --  yes   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 --  --/-- --  --    Recreational Drug Use Never --/-- --  no   Sedentary --  --/-- --  --    Tobacco Never --/-- --  never smoker  former smoker   Unemployed --  --/-- --  --     --  --/-- --  --    Working --  --/-- --  --          Immunizations  NameDate Admin Mfg Trade Name Lot Number Route Inj VIS Given VIS Publication   COVID Qlfbph8804/25/2021 NE Not Entered  NE NE 05/27/2021    Comments:    COVID Iphrbv7003/28/2021 NE Not Entered  NE NE 05/27/2021    Comments:    Hepatitis A02/18/2019 SKB HAVRIX-ADULT F4EL2 IM LD 02/18/2019 07/20/2016   Comments:    Hepatitis A04/25/2018 SKB HAVRIX-ADULT  IM LD 04/25/2018 10/25/2011   Comments: pt tolerated injection well   Daoafwwav64/24/2020 PMC Fluzone Quadrivalent [id number] IM LD 11/24/2020 08/15/2019   Comments: pt tolerated   Hrnpqnhlo7643/22/2019 MSD PNEUMOVAX 23 [id number] IM RA 11/22/2019    Comments: Patient tolerated  "injection well.         Review of Systems  · Constitutional  o Denies  o : fever, weight loss  · Cardiovascular  o Denies  o : pedal edema, claudication, chest pressure, palpitations  · Respiratory  o Denies  o : shortness of breath, wheezing, cough, hemoptysis, dyspnea on exertion  · Gastrointestinal  o Denies  o : vomiting, diarrhea, constipation      Vitals  Date Time BP Position Site L\R Cuff Size HR RR TEMP (F) WT  HT  BMI kg/m2 BSA m2 O2 Sat FR L/min FiO2 HC       05/27/2021 04:08 /74 Sitting    78 - R 18 96.4 226lbs 4oz 5'  7\" 35.44 2.2 96 %  21%          Physical Examination  · Constitutional  o Appearance  o : alert, in no acute distress, well developed, well-nourished  · Head and Face  o Head  o : normocephalic, atraumatic, non tender, no palpable masses or nodules.  o Face  o : no facial lesions  · Eyes  o Vision  o : Acuity: grossly normal at distance, Conjuntivae: Normal, Sclerae white  · Respiratory  o Auscultation of Lungs  o : normal breath sounds throughout  · Cardiovascular  o Heart  o : Regular rate and rhythm, Normal S1,S2   · Psychiatric  o Mood and Affect  o : normal mood and affect          Assessment  · Heart murmur     785.2/R01.1  · Right hip pain     719.45/M25.551  · Anemia     285.9/D64.9  · HLD (hyperlipidemia)     272.4/E78.5  · Hypothyroidism     244.9/E03.9  · Vitamin D deficiency     268.9/E55.9  · B12 deficiency     266.2/E53.8       f/u as directed    right hip pain...xray.  consider chiropractor.      refer to Dr. Oconnor    labs reviewed.       Plan  · Orders  o CARDIOLOGY CONSULTATION (CARDI) - 785.2/R01.1 - 05/27/2021  o Hip xray right 2 or more views (includes AP Pelvis) [facility] Preferred View. (77475) - 719.45/M25.551 - 05/27/2021  o Free T4 (00790) - 272.4/E78.5, 244.9/E03.9 - 11/27/2021  o Physical, Primary Care Panel (CBC, CMP, Lipid, TSH) [facility] (41071, 31384, 02087, 16230) - 285.9/D64.9, 272.4/E78.5, 244.9/E03.9, 268.9/E55.9 - 11/27/2021  o Vitamin D (25-Hydroxy) Level " (14459) - 268.9/E55.9 - 11/27/2021  o B12 Folate levels (B12FO) - 266.2/E53.8 - 11/27/2021  o Iron Profile (Iron 96518 TIBC 03073 and Transferrin 26176) (IRONP) - 285.9/D64.9 - 11/27/2021  o ACO-14: Influenza immunization administered or previously received () - - 05/27/2021  o ACO-39: Current medications updated and reviewed (1159F, ) - - 05/27/2021  · Medications  o Medications have been Reconciled  o Transition of Care or Provider Policy  · Instructions  o Electronically Identified Patient Education Materials Provided Electronically  · Disposition  o Call or Return if symptoms worsen or persist.  o Care Transition            Electronically Signed by: Darnell Umana MD -Author on May 27, 2021 06:07:35 PM

## 2021-06-29 ENCOUNTER — TELEPHONE (OUTPATIENT)
Dept: FAMILY MEDICINE CLINIC | Facility: CLINIC | Age: 61
End: 2021-06-29

## 2021-07-15 VITALS
BODY MASS INDEX: 35.51 KG/M2 | OXYGEN SATURATION: 96 % | HEIGHT: 67 IN | RESPIRATION RATE: 18 BRPM | WEIGHT: 226.25 LBS | SYSTOLIC BLOOD PRESSURE: 122 MMHG | HEART RATE: 78 BPM | TEMPERATURE: 96.4 F | DIASTOLIC BLOOD PRESSURE: 74 MMHG

## 2021-07-15 NOTE — TELEPHONE ENCOUNTER
Talked to patient she stated that she was doing better but still having a few cramps during the night.

## 2021-07-16 ENCOUNTER — TELEPHONE (OUTPATIENT)
Dept: FAMILY MEDICINE CLINIC | Facility: CLINIC | Age: 61
End: 2021-07-16

## 2021-07-16 ENCOUNTER — OFFICE VISIT (OUTPATIENT)
Dept: ORTHOPEDIC SURGERY | Facility: CLINIC | Age: 61
End: 2021-07-16

## 2021-07-16 VITALS — HEIGHT: 67 IN | BODY MASS INDEX: 35.03 KG/M2 | HEART RATE: 61 BPM | OXYGEN SATURATION: 97 % | WEIGHT: 223.2 LBS

## 2021-07-16 DIAGNOSIS — M16.11 PRIMARY OSTEOARTHRITIS OF RIGHT HIP: Primary | ICD-10-CM

## 2021-07-16 PROCEDURE — 99203 OFFICE O/P NEW LOW 30 MIN: CPT | Performed by: ORTHOPAEDIC SURGERY

## 2021-07-16 RX ORDER — LEVOTHYROXINE SODIUM 125 UG/1
CAPSULE ORAL
COMMUNITY
Start: 2021-06-24

## 2021-07-16 RX ORDER — CYCLOBENZAPRINE HCL 5 MG
5 TABLET ORAL NIGHTLY PRN
Qty: 30 TABLET | Refills: 2 | Status: SHIPPED | OUTPATIENT
Start: 2021-07-16

## 2021-07-16 RX ORDER — CETIRIZINE HYDROCHLORIDE 10 MG/1
TABLET ORAL
COMMUNITY
Start: 2021-05-05 | End: 2021-08-02 | Stop reason: SDUPTHER

## 2021-07-16 RX ORDER — MONTELUKAST SODIUM 10 MG/1
TABLET ORAL
COMMUNITY
Start: 2021-05-05 | End: 2021-08-02 | Stop reason: SDUPTHER

## 2021-07-16 RX ORDER — SPIRONOLACTONE 25 MG/1
TABLET ORAL
COMMUNITY
Start: 2021-05-05 | End: 2021-08-02 | Stop reason: SDUPTHER

## 2021-07-16 RX ORDER — PSEUDOEPHEDRINE HCL 120 MG/1
TABLET, FILM COATED, EXTENDED RELEASE ORAL
COMMUNITY
Start: 2021-05-27

## 2021-07-16 RX ORDER — PRAVASTATIN SODIUM 40 MG
TABLET ORAL
COMMUNITY
Start: 2021-05-05 | End: 2021-08-02 | Stop reason: SDUPTHER

## 2021-07-16 NOTE — PROGRESS NOTES
"Chief Complaint  Initial Evaluation and Pain of the Right Hip     Subjective      Elaine Sprigler presents to [facility] for an evaluation of right hip. Patient states she does have groin pain that radiates to the lateral aspect of her hip. She states pain has been ongoing for some time and she has a history of right hip osteoarthritis. She has been treating her osteoarthritis conservatively. She states her hip has been affecting the quality of her life.     Allergies   Allergen Reactions   • Morphine Unknown - High Severity        Social History     Socioeconomic History   • Marital status:      Spouse name: Not on file   • Number of children: Not on file   • Years of education: Not on file   • Highest education level: Not on file   Tobacco Use   • Smoking status: Never Smoker   • Smokeless tobacco: Never Used   Substance and Sexual Activity   • Alcohol use: Yes     Comment: rarely drinks, less than 1 drink per day   • Drug use: Never        Review of Systems     Objective   Vital Signs:   Pulse 61   Ht 170.2 cm (67\")   Wt 101 kg (223 lb 3.2 oz)   SpO2 97%   BMI 34.96 kg/m²       Physical Exam  Constitutional:       Appearance: Normal appearance. He is well-developed and normal weight.   HENT:      Head: Normocephalic.      Right Ear: Hearing and external ear normal.      Left Ear: Hearing and external ear normal.      Nose: Nose normal.   Eyes:      Conjunctiva/sclera: Conjunctivae normal.   Cardiovascular:      Rate and Rhythm: Normal rate.   Pulmonary:      Effort: Pulmonary effort is normal.      Breath sounds: No wheezing or rales.   Abdominal:      Palpations: Abdomen is soft.      Tenderness: There is no abdominal tenderness.   Musculoskeletal:      Cervical back: Normal range of motion.   Skin:     Findings: No rash.   Neurological:      Mental Status: He is alert and oriented to person, place, and time.   Psychiatric:         Mood and Affect: Mood and affect normal. "         Judgment: Judgment normal.       Ortho Exam      RIGHT HIP: Good tone of hip flexors, hip extensors, hip adductor, hip abductors. Good strength to hamstrings, quadriceps, dorsiflexors and plantar flexors. Sensation grossly intact. Neurovascular intact. Skin intact. Tender hip and pelvic muscles. Dorsal Pedal Pulse 2+, posteriror tibialis pulse 2+. Calf supple, non-tender. Full weight bearing. Limping gait. Decreasing range of motion of the hip. Groin pain with hip range of motion. No swelling or skin discoloration.       Procedures        Imaging Results (Most Recent)     None           Result Review :         [facility]            PACS RADIOLOGY REPORT     Patient: SPRIGLER,ELAINE     Acct: [id number]     Report: #[id number]  UNIT #: [id number]     DOS: 2021     Order #: [id number]  Location: [facility]     : 1960  ORDERING: SHIRLEY BOWENS  DICTATING: Saurabh Hollins #: [id number]     EXAM: HIPPELRT - HIP RIGHT 2 VIEWS w pelvis  REASON FOR EXAM:   REASON FOR VISIT: RIGH HIP PAIN  --------------------------------------------------------------------------------------------------------------------  PROCEDURE: 2 VIEW RIGHT HIP  WITH SINGLE VIEW AP PELVIS         COMPARISON: [facility], , RIGHT HIP/PELVIS, 2017, 16:47.         INDICATIONS: POSTERIOR AND LATERAL RIGHT HIP PAIN RADIATING DOWN LEG FOR 3 MONTHS. NO KNOWN INJURY.         FINDINGS:          Advanced degenerative changes are present in the right hip.  There are moderate degenerative     changes in the left hip.  No fractures, dislocations or acute osseous abnormalities are identified.         IMPRESSION:    Advanced degenerative change in the right hip.  Moderate degenerative change in the left hip.             Assessment and Plan     DX: Right hip osteoarthritis     Discussed treatment plans with the patient. Discussed right total hip arthroplasty vs  non-operative measures. Patient wants to think about surgical intervention at this time.     Discussed surgery., Risks/benefits discussed with patient including, but not limited to: infection, bleeding, neurovascular damage, malunion, nonunion, aesthetic deformity, need for further surgery, and death., Discussed with patient the implant type being used during surgery and patient understands and desires to proceed. and Call or return if worsening symptoms.    Follow Up     Patient will call back.       Patient was given instructions and counseling regarding her condition or for health maintenance advice. Please see specific information pulled into the AVS if appropriate.     Scribed for Osito St MD by Leela Kapadia.  07/16/21   16:38 EDT    I have personally performed the services described in this document as scribed by the above individual and it is both accurate and complete.  Osito St MD 07/16/21  16:38 EDT

## 2021-07-16 NOTE — TELEPHONE ENCOUNTER
Patient is aware DR Umana is going to send in a muscle relaxer. Patient wants it sent to [facility].

## 2021-07-29 NOTE — PROGRESS NOTES
"Chief Complaint  Initial Evaluation and Pain of the Right Hip     Subjective      Elaine Sprigler presents to [facility] for an evaluation of right hip. Patient states she does have groin pain that radiates to the lateral aspect of her hip. She states pain has been ongoing for some time and she has a history of right hip osteoarthritis. She has been treating her osteoarthritis conservatively. She states her hip has been affecting the quality of her life.     Allergies   Allergen Reactions   • Morphine Unknown - High Severity        Social History     Socioeconomic History   • Marital status:      Spouse name: Not on file   • Number of children: Not on file   • Years of education: Not on file   • Highest education level: Not on file   Tobacco Use   • Smoking status: Never Smoker   • Smokeless tobacco: Never Used   Substance and Sexual Activity   • Alcohol use: Yes     Comment: rarely drinks, less than 1 drink per day   • Drug use: Never        Review of Systems     Objective   Vital Signs:   Pulse 61   Ht 170.2 cm (67\")   Wt 101 kg (223 lb 3.2 oz)   SpO2 97%   BMI 34.96 kg/m²       Physical Exam  Constitutional:       Appearance: Normal appearance. He is well-developed and normal weight.   HENT:      Head: Normocephalic.      Right Ear: Hearing and external ear normal.      Left Ear: Hearing and external ear normal.      Nose: Nose normal.   Eyes:      Conjunctiva/sclera: Conjunctivae normal.   Cardiovascular:      Rate and Rhythm: Normal rate.   Pulmonary:      Effort: Pulmonary effort is normal.      Breath sounds: No wheezing or rales.   Abdominal:      Palpations: Abdomen is soft.      Tenderness: There is no abdominal tenderness.   Musculoskeletal:      Cervical back: Normal range of motion.   Skin:     Findings: No rash.   Neurological:      Mental Status: He is alert and oriented to person, place, and time.   Psychiatric:         Mood and Affect: Mood and affect normal. "         Judgment: Judgment normal.       Ortho Exam      RIGHT HIP: Good tone of hip flexors, hip extensors, hip adductor, hip abductors. Good strength to hamstrings, quadriceps, dorsiflexors and plantar flexors. Sensation grossly intact. Neurovascular intact. Skin intact. Tender hip and pelvic muscles. Dorsal Pedal Pulse 2+, posteriror tibialis pulse 2+. Calf supple, non-tender. Full weight bearing. Limping gait. Decreasing range of motion of the hip. Groin pain with hip range of motion. No swelling or skin discoloration.       Procedures        Imaging Results (Most Recent)     None           Result Review :         [facility]            PACS RADIOLOGY REPORT     Patient: SPRIGLER,ELAINE     Acct: [id number]     Report: #[id number]  UNIT #: [id number]     DOS: 2021     Order #: [id number]  Location: [facility]     : 1960  ORDERING: SHIRLEY BOWENS  DICTATING: Saurabh Hollins #: [id number]     EXAM: HIPPELRT - HIP RIGHT 2 VIEWS w pelvis  REASON FOR EXAM:   REASON FOR VISIT: RIGH HIP PAIN  --------------------------------------------------------------------------------------------------------------------  PROCEDURE: 2 VIEW RIGHT HIP  WITH SINGLE VIEW AP PELVIS         COMPARISON: [facility], , RIGHT HIP/PELVIS, 2017, 16:47.         INDICATIONS: POSTERIOR AND LATERAL RIGHT HIP PAIN RADIATING DOWN LEG FOR 3 MONTHS. NO KNOWN INJURY.         FINDINGS:          Advanced degenerative changes are present in the right hip.  There are moderate degenerative     changes in the left hip.  No fractures, dislocations or acute osseous abnormalities are identified.         IMPRESSION:    Advanced degenerative change in the right hip.  Moderate degenerative change in the left hip.             Assessment and Plan     DX: Right hip osteoarthritis     Discussed treatment plans with the patient. Discussed right total hip arthroplasty vs  non-operative measures. Patient wants to think about surgical intervention at this time.     Discussed surgery., Risks/benefits discussed with patient including, but not limited to: infection, bleeding, neurovascular damage, malunion, nonunion, aesthetic deformity, need for further surgery, and death., Discussed with patient the implant type being used during surgery and patient understands and desires to proceed. and Call or return if worsening symptoms.    Follow Up     Patient will call back.       Patient was given instructions and counseling regarding her condition or for health maintenance advice. Please see specific information pulled into the AVS if appropriate.     Scribed for Osito St MD by Leela Kapadia.  07/16/21   16:38 EDT    I have personally performed the services described in this document as scribed by the above individual and it is both accurate and complete. Osito St MD 07/29/21

## 2021-08-02 ENCOUNTER — TELEPHONE (OUTPATIENT)
Dept: FAMILY MEDICINE CLINIC | Facility: CLINIC | Age: 61
End: 2021-08-02

## 2021-08-02 RX ORDER — PRAVASTATIN SODIUM 40 MG
40 TABLET ORAL NIGHTLY
Qty: 90 TABLET | Refills: 3 | Status: SHIPPED | OUTPATIENT
Start: 2021-08-02

## 2021-08-02 RX ORDER — CETIRIZINE HYDROCHLORIDE 10 MG/1
10 TABLET ORAL DAILY
Qty: 90 TABLET | Refills: 3 | Status: SHIPPED | OUTPATIENT
Start: 2021-08-02

## 2021-08-02 RX ORDER — MONTELUKAST SODIUM 10 MG/1
10 TABLET ORAL NIGHTLY
Qty: 90 TABLET | Refills: 3 | Status: SHIPPED | OUTPATIENT
Start: 2021-08-02

## 2021-08-02 RX ORDER — POTASSIUM CHLORIDE 750 MG/1
10 TABLET, FILM COATED, EXTENDED RELEASE ORAL DAILY
Qty: 10 TABLET | Refills: 0 | Status: SHIPPED | OUTPATIENT
Start: 2021-08-02

## 2021-08-02 RX ORDER — SPIRONOLACTONE 25 MG/1
25 TABLET ORAL DAILY
Qty: 90 TABLET | Refills: 3 | Status: SHIPPED | OUTPATIENT
Start: 2021-08-02